# Patient Record
Sex: FEMALE | Race: BLACK OR AFRICAN AMERICAN | Employment: UNEMPLOYED | URBAN - METROPOLITAN AREA
[De-identification: names, ages, dates, MRNs, and addresses within clinical notes are randomized per-mention and may not be internally consistent; named-entity substitution may affect disease eponyms.]

---

## 2017-11-28 ENCOUNTER — OFFICE VISIT (OUTPATIENT)
Dept: SURGERY | Age: 32
End: 2017-11-28

## 2017-11-28 VITALS
RESPIRATION RATE: 16 BRPM | DIASTOLIC BLOOD PRESSURE: 73 MMHG | SYSTOLIC BLOOD PRESSURE: 111 MMHG | BODY MASS INDEX: 38.74 KG/M2 | WEIGHT: 205.2 LBS | OXYGEN SATURATION: 99 % | HEIGHT: 61 IN | HEART RATE: 71 BPM

## 2017-11-28 DIAGNOSIS — K42.9 UMBILICAL HERNIA WITHOUT OBSTRUCTION AND WITHOUT GANGRENE: Primary | ICD-10-CM

## 2017-11-28 RX ORDER — TOPIRAMATE 25 MG/1
TABLET ORAL
Status: ON HOLD | COMMUNITY
End: 2017-12-15

## 2017-11-28 NOTE — MR AVS SNAPSHOT
Visit Information Date & Time Provider Department Dept. Phone Encounter #  
 11/28/2017 11:20 AM Lester Martin MD [facility] [address] [id number] Your Appointments 12/29/2017  3:40 PM  
POST OP with Lester Martin MD  
[facility] ([address]) Appt Note: po- UHR 12/15/17  
 [address], [address] [facility], [address] [phone]  
[address] [facility], [address] [facility] [address] [phone] Upcoming Health Maintenance Date Due Pneumococcal 19-64 Medium Risk (1 of 1 - PPSV23) 11/5/2004 DTaP/Tdap/Td series (1 - Tdap) 11/5/2006 PAP AKA CERVICAL CYTOLOGY 11/5/2006 Influenza Age 5 to Adult 8/1/2017 Allergies as of 11/28/2017  Review Complete On: 11/28/2017 By: Lester Martin MD  
  
 Severity Noted Reaction Type Reactions Iodine  11/28/2017    Unknown (comments) Topical only Pcn [Penicillins]  08/21/2012    Other (comments) Unknown her mother told her she was Current Immunizations  Never Reviewed Name Date Influenza Vaccine PF 9/23/2015 Not reviewed this visit You Were Diagnosed With   
  
 Codes Comments Umbilical hernia without obstruction and without gangrene    -  Primary ICD-10-CM: K42.9 ICD-9-CM: 553.1 Vitals BP Pulse Resp Height(growth percentile) Weight(growth percentile) SpO2  
 111/73 (BP 1 Location: Left arm, BP Patient Position: Sitting) 71 16 5' 0.5\" (1.537 m) 205 lb 3.2 oz (93.1 kg) 99% BMI OB Status Smoking Status 39.42 kg/m2 Having regular periods Former Smoker BMI and BSA Data Body Mass Index Body Surface Area  
 39.42 kg/m 2 1.99 m 2 Preferred Pharmacy Pharmacy Name Phone [id number] Joeljoey Fisher, [address] [place] [address] [phone] Your Updated Medication List  
  
   
 This list is accurate as of: 11/28/17  1:12 PM.  Always use your most recent med list.  
  
  
  
  
 ergocalciferol 50,000 unit capsule Commonly known as:  ERGOCALCIFEROL Take 1 Cap by mouth every seven (7) days. escitalopram oxalate 10 mg tablet Commonly known as:  Darcus Skillern Take 1 Tab by mouth daily. ibuprofen 600 mg tablet Commonly known as:  MOTRIN Take 1 Tab by mouth every six (6) hours as needed for Pain. Omeprazole delayed release 20 mg tablet Commonly known as:  PRILOSEC D/R Take 1 Tab by mouth daily. OTHER  
methylprednisone 4 mg - migraines   Indications: Take daily  
  
 topiramate 25 mg tablet Commonly known as:  TOPAMAX Take  by mouth daily (with breakfast). Take only as needed after methylprednisone is finished. traZODone 50 mg tablet Commonly known as:  Ravin Oh Take 1 Tab by mouth nightly. Introducing [facility]! [facility] introduces [employer] patient portal. Now you can access parts of your medical record, email your doctor's office, and request medication refills online. 1. In your internet browser, go to [email or web address] 2. Click on the First Time User? Click Here link in the Sign In box. You will see the New Member Sign Up page. 3. Enter your [employer] Access Code exactly as it appears below. You will not need to use this code after youve completed the sign-up process. If you do not sign up before the expiration date, you must request a new code. · [employer] Access Code: [email or web address] Expires: 2/26/2018 11:00 AM 
 
4. Enter the last four digits of your Social Security Number (xxxx) and Date of Birth (mm/dd/yyyy) as indicated and click Submit. You will be taken to the next sign-up page. 5. Create a NSCt ID. This will be your [employer] login ID and cannot be changed, so think of one that is secure and easy to remember. 6. Create a NSCt password. You can change your password at any time. 7. Enter your Password Reset Question and Answer. This can be used at a later time if you forget your password. 8. Enter your e-mail address. You will receive e-mail notification when new information is available in [address] 9. Click Sign Up. You can now view and download portions of your medical record. 10. Click the Download Summary menu link to download a portable copy of your medical information. If you have questions, please visit the Frequently Asked Questions section of the [employer] website. Remember, [employer] is NOT to be used for urgent needs. For medical emergencies, dial 911. Now available from your iPhone and Android! Please provide this summary of care documentation to your next provider. Your primary care clinician is listed as Tio Porras. If you have any questions after today's visit, please call [phone].

## 2017-11-28 NOTE — PROGRESS NOTES
Surgery Consult:  Umbilical hernia  Requesting physician:  Self referred    Subjective:   Patient 28 y.o.  female presents with umbilical bulge for 2 years. It's increasing in size. It hasn't been bothering until more recently. For the past 6 months, patient has been having intermittent sharp periumbilical pain without radiation especially with lifting. Patient also complains of intermittent nausea but no emesis. No recent trauma, but patient does lot of heavy lifting at work > 50 lbs. Patient also with mild constipation. Prior abdominal surgeries include  x 3. No F/C/S. No dysuria. Past Medical & Surgical History:  Past Medical History:   Diagnosis Date    Asthma      delivery     Headache     migraine    Hernia, umbilical 9401    Psychiatric disorder     depression and anxiety      Past Surgical History:   Procedure Laterality Date    HX  SECTION  , ,     [facility] (Dr Jackelin Wells), [facility] (Dr Jackelin Wells)       Social History:  Social History     Social History    Marital status:      Spouse name: N/A    Number of children: N/A    Years of education: N/A     Occupational History    Not on file.      Social History Main Topics    Smoking status: Former Smoker     Packs/day: 0.25     Years: 5.00    Smokeless tobacco: Never Used    Alcohol use Yes      Comment: rarely    Drug use: Not on file    Sexual activity: Yes     Partners: Male     Other Topics Concern    Not on file     Social History Narrative        Family History:  Family History   Problem Relation Age of Onset    Diabetes Father     Hypertension Father     Diabetes Maternal Grandmother     Cancer Paternal Aunt      breast    Hypertension Paternal Grandmother     Diabetes Paternal Grandmother     Cancer Paternal Grandmother      lung    Cancer Paternal Grandfather      pancreas        Medications:  Current Outpatient Prescriptions   Medication Sig    OTHER methylprednisone 4 mg - migraines   Indications: Take daily    topiramate (TOPAMAX) 25 mg tablet Take  by mouth daily (with breakfast). Take only as needed after methylprednisone is finished.  Omeprazole delayed release (PRILOSEC D/R) 20 mg tablet Take 1 Tab by mouth daily.  ergocalciferol (ERGOCALCIFEROL) 50,000 unit capsule Take 1 Cap by mouth every seven (7) days.  escitalopram oxalate (LEXAPRO) 10 mg tablet Take 1 Tab by mouth daily.  traZODone (DESYREL) 50 mg tablet Take 1 Tab by mouth nightly.  ibuprofen (MOTRIN) 600 mg tablet Take 1 Tab by mouth every six (6) hours as needed for Pain. No current facility-administered medications for this visit. Allergies: Allergies   Allergen Reactions    Iodine Unknown (comments)     Topical only    Pcn [Penicillins] Other (comments)     Unknown her mother told her she was       Review of Systems  A comprehensive review of systems was negative except for that written in the HPI. Objective:     Exam:    Visit Vitals    /73 (BP 1 Location: Left arm, BP Patient Position: Sitting)    Pulse 71    Resp 16    Ht 5' 0.5\" (1.537 m)    Wt 93.1 kg (205 lb 3.2 oz)    SpO2 99%    BMI 39.42 kg/m2     General appearance: alert, cooperative, no distress, appears stated age  Eyes: no sclera icterus  Lungs: clear to auscultation bilaterally  Heart: regular rate and rhythm  Abdomen: soft, non-tender. Non-distended. Umbilical bulge, non-reducible, measuring 1 x 2 cm. Unable to appreciate the size of the defect. Extremities: extremities normal, atraumatic, no cyanosis or edema  Skin: Skin color, texture, turgor normal. No rashes or lesions. No jaundice. Neurologic: Grossly normal      Assessment:     Umbilical hernia, non-reducible  Morbid obesity (BMI 39)    Plan:     Incarcerated UHR  Risks, benefit, and alternative to surgery was discussed with the patient.   The risks of surgery include but not limited to infection, bleeding, intraabdominal organ injury, recurrence, and the risks of general anesthetic. Patient is agreeable to surgery. All questions answered.

## 2017-12-05 ENCOUNTER — TELEPHONE (OUTPATIENT)
Dept: SURGERY | Age: 32
End: 2017-12-05

## 2017-12-11 NOTE — PERIOP NOTES
[facility]  Preoperative Instructions        Surgery Date 12/15/17          Time of Arrival 8:30am    1. On the day of your surgery, please report to the Surgical Services Registration Desk and sign in at your designated time. The Surgery Center is located to the right of the Emergency Room. 2. You must have someone with you to drive you home. You should not drive a car for 24 hours following surgery. Please make arrangements for a friend or family member to stay with you for the first 24 hours after your surgery. 3. Do not have anything to eat or drink (including water, gum, mints, coffee, juice) after midnight 12/14/17      . ? This may not apply to medications prescribed by your physician. ?(Please note below the special instructions with medications to take the morning of your procedure.)    4. We recommend you do not drink any alcoholic beverages for 24 hours before and after your surgery. 5. Contact your surgeons office for instructions on the following medications: non-steroidal anti-inflammatory drugs (i.e. Advil, Aleve), vitamins, and supplements. (Some surgeons will want you to stop these medications prior to surgery and others may allow you to take them)  **If you are currently taking Plavix, Coumadin, Aspirin and/or other blood-thinning agents, contact your surgeon for instructions. ** Your surgeon will partner with the physician prescribing these medications to determine if it is safe to stop or if you need to continue taking. Please do not stop taking these medications without instructions from your surgeon    6. Wear comfortable clothes. Wear glasses instead of contacts. Do not bring any money or jewelry. Please bring picture ID, insurance card, and any prearranged co-payment or hospital payment. Do not wear make-up, particularly mascara the morning of your surgery. Do not wear nail polish, particularly if you are having foot /hand surgery.   Wear your hair loose or down, no ponytails, buns, brie pins or clips. All body piercings must be removed. Please shower with antibacterial soap for three consecutive days before and on the morning of surgery, but do not apply any lotions, powders or deodorants after the shower on the day of surgery. Please use a fresh towels after each shower. Please sleep in clean clothes and change bed linens the night before surgery. Please do not shave for 48 hours prior to surgery. Shaving of the face is acceptable. 7. You should understand that if you do not follow these instructions your surgery may be cancelled. If your physical condition changes (I.e. fever, cold or flu) please contact your surgeon as soon as possible. 8. It is important that you be on time. If a situation occurs where you may be late, please call [phone] (OR Holding Area). 9. If you have any questions and or problems, please call [phone] (Pre-admission Testing). 10. Your surgery time may be subject to change. You will receive a phone call the evening prior if your time changes. 11.  If having outpatient surgery, you must have someone to drive you here, stay with you during the duration of your stay, and to drive you home at time of discharge. 12.   In an effort to improve the efficiency, privacy, and safety for all of our Pre-op patients visitors are not allowed in the Holding area. Once you arrive and are registered your family/visitors will be asked to remain in the waiting room. The Pre-op staff will get you from the Surgical Waiting Area and will explain to you and your family/visitors that the Pre-op phase is beginning. The staff will answer any questions and provide instructions for tracking of the patient, by use of the existing tracking number and color-coded status board in the waiting room.   At this time the staff will also ask for your designated spokesperson information in the event that the physician or staff need to provide an update or obtain any pertinent information. The designated spokesperson will be notified if the physician needs to speak to family during the pre-operative phase. If at any time your family/visitors has questions or concerns they may approach the volunteer desk in the waiting area for assistance. Special Instructions:    MEDICATIONS TO TAKE THE MORNING OF SURGERY WITH A SIP OF WATER:None      I understand a pre-operative phone call will be made to verify my surgery time. In the event that I am not available, I give permission for a message to be left on my answering service and/or with another person?  Yes          ___________________      __________   _________    (Signature of Patient)             (Witness)                (Date and Time)

## 2017-12-15 ENCOUNTER — HOSPITAL ENCOUNTER (OUTPATIENT)
Age: 32
Setting detail: OUTPATIENT SURGERY
Discharge: HOME | End: 2017-12-15
Attending: SURGERY | Admitting: SURGERY
Payer: MEDICAID

## 2017-12-15 ENCOUNTER — ANESTHESIA EVENT (OUTPATIENT)
Dept: SURGERY | Age: 32
End: 2017-12-15
Payer: MEDICAID

## 2017-12-15 ENCOUNTER — ANESTHESIA (OUTPATIENT)
Dept: SURGERY | Age: 32
End: 2017-12-15
Payer: MEDICAID

## 2017-12-15 VITALS
HEART RATE: 65 BPM | RESPIRATION RATE: 16 BRPM | DIASTOLIC BLOOD PRESSURE: 67 MMHG | TEMPERATURE: 98.2 F | BODY MASS INDEX: 40.47 KG/M2 | WEIGHT: 206.13 LBS | SYSTOLIC BLOOD PRESSURE: 114 MMHG | HEIGHT: 60 IN | OXYGEN SATURATION: 94 %

## 2017-12-15 LAB — HCG UR QL: NEGATIVE

## 2017-12-15 PROCEDURE — 74011250636 HC RX REV CODE- 250/636

## 2017-12-15 PROCEDURE — 77030002974 HC SUT PLN J&J -A: Performed by: SURGERY

## 2017-12-15 PROCEDURE — 77030026438 HC STYL ET INTUB CARD -A: Performed by: ANESTHESIOLOGY

## 2017-12-15 PROCEDURE — 74011250636 HC RX REV CODE- 250/636: Performed by: ANESTHESIOLOGY

## 2017-12-15 PROCEDURE — 81025 URINE PREGNANCY TEST: CPT

## 2017-12-15 PROCEDURE — 76210000016 HC OR PH I REC 1 TO 1.5 HR: Performed by: SURGERY

## 2017-12-15 PROCEDURE — 77030018547 HC SUT ETHBND1 J&J -B: Performed by: SURGERY

## 2017-12-15 PROCEDURE — 74011000250 HC RX REV CODE- 250: Performed by: SURGERY

## 2017-12-15 PROCEDURE — 76210000021 HC REC RM PH II 0.5 TO 1 HR: Performed by: SURGERY

## 2017-12-15 PROCEDURE — 76010000138 HC OR TIME 0.5 TO 1 HR: Performed by: SURGERY

## 2017-12-15 PROCEDURE — 77030008684 HC TU ET CUF COVD -B: Performed by: ANESTHESIOLOGY

## 2017-12-15 PROCEDURE — 76060000032 HC ANESTHESIA 0.5 TO 1 HR: Performed by: SURGERY

## 2017-12-15 PROCEDURE — 77030018836 HC SOL IRR NACL ICUM -A: Performed by: SURGERY

## 2017-12-15 PROCEDURE — 77030002986 HC SUT PROL J&J -A: Performed by: SURGERY

## 2017-12-15 PROCEDURE — 74011000250 HC RX REV CODE- 250

## 2017-12-15 PROCEDURE — 77030011640 HC PAD GRND REM COVD -A: Performed by: SURGERY

## 2017-12-15 PROCEDURE — 77030020782 HC GWN BAIR PAWS FLX 3M -B

## 2017-12-15 PROCEDURE — 77030010507 HC ADH SKN DERMBND J&J -B: Performed by: SURGERY

## 2017-12-15 PROCEDURE — 74011250636 HC RX REV CODE- 250/636: Performed by: SURGERY

## 2017-12-15 PROCEDURE — 77030036554: Performed by: SURGERY

## 2017-12-15 PROCEDURE — 77030032490 HC SLV COMPR SCD KNE COVD -B: Performed by: SURGERY

## 2017-12-15 PROCEDURE — 77030031139 HC SUT VCRL2 J&J -A: Performed by: SURGERY

## 2017-12-15 RX ORDER — LIDOCAINE HYDROCHLORIDE 10 MG/ML
0.1 INJECTION, SOLUTION EPIDURAL; INFILTRATION; INTRACAUDAL; PERINEURAL AS NEEDED
Status: DISCONTINUED | OUTPATIENT
Start: 2017-12-15 | End: 2017-12-15 | Stop reason: HOSPADM

## 2017-12-15 RX ORDER — ONDANSETRON 2 MG/ML
INJECTION INTRAMUSCULAR; INTRAVENOUS AS NEEDED
Status: DISCONTINUED | OUTPATIENT
Start: 2017-12-15 | End: 2017-12-15 | Stop reason: HOSPADM

## 2017-12-15 RX ORDER — FENTANYL CITRATE 50 UG/ML
25 INJECTION, SOLUTION INTRAMUSCULAR; INTRAVENOUS
Status: DISCONTINUED | OUTPATIENT
Start: 2017-12-15 | End: 2017-12-15 | Stop reason: HOSPADM

## 2017-12-15 RX ORDER — GLYCOPYRROLATE 0.2 MG/ML
INJECTION INTRAMUSCULAR; INTRAVENOUS AS NEEDED
Status: DISCONTINUED | OUTPATIENT
Start: 2017-12-15 | End: 2017-12-15 | Stop reason: HOSPADM

## 2017-12-15 RX ORDER — DIPHENHYDRAMINE HYDROCHLORIDE 50 MG/ML
12.5 INJECTION, SOLUTION INTRAMUSCULAR; INTRAVENOUS AS NEEDED
Status: DISCONTINUED | OUTPATIENT
Start: 2017-12-15 | End: 2017-12-15 | Stop reason: HOSPADM

## 2017-12-15 RX ORDER — MIDAZOLAM HYDROCHLORIDE 1 MG/ML
INJECTION, SOLUTION INTRAMUSCULAR; INTRAVENOUS AS NEEDED
Status: DISCONTINUED | OUTPATIENT
Start: 2017-12-15 | End: 2017-12-15 | Stop reason: HOSPADM

## 2017-12-15 RX ORDER — DOCUSATE SODIUM 100 MG/1
100 CAPSULE, LIQUID FILLED ORAL 2 TIMES DAILY
Qty: 30 CAP | Refills: 0 | Status: SHIPPED | OUTPATIENT
Start: 2017-12-15 | End: 2019-09-14

## 2017-12-15 RX ORDER — SODIUM CHLORIDE, SODIUM LACTATE, POTASSIUM CHLORIDE, CALCIUM CHLORIDE 600; 310; 30; 20 MG/100ML; MG/100ML; MG/100ML; MG/100ML
25 INJECTION, SOLUTION INTRAVENOUS CONTINUOUS
Status: DISCONTINUED | OUTPATIENT
Start: 2017-12-15 | End: 2017-12-15 | Stop reason: HOSPADM

## 2017-12-15 RX ORDER — PROPOFOL 10 MG/ML
INJECTION, EMULSION INTRAVENOUS AS NEEDED
Status: DISCONTINUED | OUTPATIENT
Start: 2017-12-15 | End: 2017-12-15 | Stop reason: HOSPADM

## 2017-12-15 RX ORDER — NEOSTIGMINE METHYLSULFATE 1 MG/ML
INJECTION INTRAVENOUS AS NEEDED
Status: DISCONTINUED | OUTPATIENT
Start: 2017-12-15 | End: 2017-12-15 | Stop reason: HOSPADM

## 2017-12-15 RX ORDER — CLINDAMYCIN PHOSPHATE 900 MG/50ML
900 INJECTION INTRAVENOUS
Status: COMPLETED | OUTPATIENT
Start: 2017-12-15 | End: 2017-12-15

## 2017-12-15 RX ORDER — FENTANYL CITRATE 50 UG/ML
INJECTION, SOLUTION INTRAMUSCULAR; INTRAVENOUS AS NEEDED
Status: DISCONTINUED | OUTPATIENT
Start: 2017-12-15 | End: 2017-12-15 | Stop reason: HOSPADM

## 2017-12-15 RX ORDER — MIDAZOLAM HYDROCHLORIDE 1 MG/ML
1 INJECTION, SOLUTION INTRAMUSCULAR; INTRAVENOUS AS NEEDED
Status: DISCONTINUED | OUTPATIENT
Start: 2017-12-15 | End: 2017-12-15 | Stop reason: HOSPADM

## 2017-12-15 RX ORDER — HYDROCODONE BITARTRATE AND ACETAMINOPHEN 5; 325 MG/1; MG/1
1-2 TABLET ORAL
Qty: 50 TAB | Refills: 0 | Status: SHIPPED | OUTPATIENT
Start: 2017-12-15 | End: 2019-09-14

## 2017-12-15 RX ORDER — ONDANSETRON 2 MG/ML
4 INJECTION INTRAMUSCULAR; INTRAVENOUS AS NEEDED
Status: DISCONTINUED | OUTPATIENT
Start: 2017-12-15 | End: 2017-12-15 | Stop reason: HOSPADM

## 2017-12-15 RX ORDER — LIDOCAINE HYDROCHLORIDE 20 MG/ML
INJECTION, SOLUTION EPIDURAL; INFILTRATION; INTRACAUDAL; PERINEURAL AS NEEDED
Status: DISCONTINUED | OUTPATIENT
Start: 2017-12-15 | End: 2017-12-15 | Stop reason: HOSPADM

## 2017-12-15 RX ORDER — DEXAMETHASONE SODIUM PHOSPHATE 4 MG/ML
INJECTION, SOLUTION INTRA-ARTICULAR; INTRALESIONAL; INTRAMUSCULAR; INTRAVENOUS; SOFT TISSUE AS NEEDED
Status: DISCONTINUED | OUTPATIENT
Start: 2017-12-15 | End: 2017-12-15 | Stop reason: HOSPADM

## 2017-12-15 RX ORDER — ACETAMINOPHEN 10 MG/ML
INJECTION, SOLUTION INTRAVENOUS AS NEEDED
Status: DISCONTINUED | OUTPATIENT
Start: 2017-12-15 | End: 2017-12-15 | Stop reason: HOSPADM

## 2017-12-15 RX ORDER — METHYLPREDNISOLONE 4 MG/1
TABLET ORAL
COMMUNITY
End: 2019-09-14

## 2017-12-15 RX ORDER — KETOROLAC TROMETHAMINE 30 MG/ML
INJECTION, SOLUTION INTRAMUSCULAR; INTRAVENOUS AS NEEDED
Status: DISCONTINUED | OUTPATIENT
Start: 2017-12-15 | End: 2017-12-15 | Stop reason: HOSPADM

## 2017-12-15 RX ORDER — BUPIVACAINE HYDROCHLORIDE AND EPINEPHRINE 2.5; 5 MG/ML; UG/ML
INJECTION, SOLUTION EPIDURAL; INFILTRATION; INTRACAUDAL; PERINEURAL AS NEEDED
Status: DISCONTINUED | OUTPATIENT
Start: 2017-12-15 | End: 2017-12-15 | Stop reason: HOSPADM

## 2017-12-15 RX ORDER — ROCURONIUM BROMIDE 10 MG/ML
INJECTION, SOLUTION INTRAVENOUS AS NEEDED
Status: DISCONTINUED | OUTPATIENT
Start: 2017-12-15 | End: 2017-12-15 | Stop reason: HOSPADM

## 2017-12-15 RX ORDER — FENTANYL CITRATE 50 UG/ML
50 INJECTION, SOLUTION INTRAMUSCULAR; INTRAVENOUS AS NEEDED
Status: DISCONTINUED | OUTPATIENT
Start: 2017-12-15 | End: 2017-12-15 | Stop reason: HOSPADM

## 2017-12-15 RX ORDER — HYDROMORPHONE HYDROCHLORIDE 1 MG/ML
.2-.5 INJECTION, SOLUTION INTRAMUSCULAR; INTRAVENOUS; SUBCUTANEOUS
Status: DISCONTINUED | OUTPATIENT
Start: 2017-12-15 | End: 2017-12-15 | Stop reason: HOSPADM

## 2017-12-15 RX ADMIN — LIDOCAINE HYDROCHLORIDE 100 MG: 20 INJECTION, SOLUTION EPIDURAL; INFILTRATION; INTRACAUDAL; PERINEURAL at 11:09

## 2017-12-15 RX ADMIN — GLYCOPYRROLATE 0.2 MG: 0.2 INJECTION INTRAMUSCULAR; INTRAVENOUS at 11:05

## 2017-12-15 RX ADMIN — ONDANSETRON 4 MG: 2 INJECTION INTRAMUSCULAR; INTRAVENOUS at 10:36

## 2017-12-15 RX ADMIN — FENTANYL CITRATE 25 MCG: 50 INJECTION, SOLUTION INTRAMUSCULAR; INTRAVENOUS at 12:15

## 2017-12-15 RX ADMIN — KETOROLAC TROMETHAMINE 30 MG: 30 INJECTION, SOLUTION INTRAMUSCULAR; INTRAVENOUS at 11:06

## 2017-12-15 RX ADMIN — ONDANSETRON 4 MG: 2 INJECTION INTRAMUSCULAR; INTRAVENOUS at 11:39

## 2017-12-15 RX ADMIN — FENTANYL CITRATE 25 MCG: 50 INJECTION, SOLUTION INTRAMUSCULAR; INTRAVENOUS at 11:32

## 2017-12-15 RX ADMIN — MIDAZOLAM HYDROCHLORIDE 2 MG: 1 INJECTION, SOLUTION INTRAMUSCULAR; INTRAVENOUS at 10:24

## 2017-12-15 RX ADMIN — FENTANYL CITRATE 100 MCG: 50 INJECTION, SOLUTION INTRAMUSCULAR; INTRAVENOUS at 10:30

## 2017-12-15 RX ADMIN — SODIUM CHLORIDE, SODIUM LACTATE, POTASSIUM CHLORIDE, AND CALCIUM CHLORIDE 25 ML/HR: 600; 310; 30; 20 INJECTION, SOLUTION INTRAVENOUS at 09:35

## 2017-12-15 RX ADMIN — FENTANYL CITRATE 25 MCG: 50 INJECTION, SOLUTION INTRAMUSCULAR; INTRAVENOUS at 11:42

## 2017-12-15 RX ADMIN — LIDOCAINE HYDROCHLORIDE 100 MG: 20 INJECTION, SOLUTION EPIDURAL; INFILTRATION; INTRACAUDAL; PERINEURAL at 10:30

## 2017-12-15 RX ADMIN — NEOSTIGMINE METHYLSULFATE 3 MG: 1 INJECTION INTRAVENOUS at 11:05

## 2017-12-15 RX ADMIN — CLINDAMYCIN PHOSPHATE 900 MG: 18 INJECTION, SOLUTION INTRAVENOUS at 10:33

## 2017-12-15 RX ADMIN — PROPOFOL 200 MG: 10 INJECTION, EMULSION INTRAVENOUS at 10:30

## 2017-12-15 RX ADMIN — ACETAMINOPHEN 1000 MG: 10 INJECTION, SOLUTION INTRAVENOUS at 11:06

## 2017-12-15 RX ADMIN — DEXAMETHASONE SODIUM PHOSPHATE 8 MG: 4 INJECTION, SOLUTION INTRA-ARTICULAR; INTRALESIONAL; INTRAMUSCULAR; INTRAVENOUS; SOFT TISSUE at 10:36

## 2017-12-15 RX ADMIN — ROCURONIUM BROMIDE 30 MG: 10 INJECTION, SOLUTION INTRAVENOUS at 10:30

## 2017-12-15 RX ADMIN — FENTANYL CITRATE 25 MCG: 50 INJECTION, SOLUTION INTRAMUSCULAR; INTRAVENOUS at 12:03

## 2017-12-15 NOTE — INTERVAL H&P NOTE
H&P Update:  Betty Montano was seen and examined. History and physical has been reviewed. The patient has been examined.  There have been no significant clinical changes since the completion of the originally dated History and Physical.    Signed By: Meg Moran MD     December 15, 2017 8:46 AM

## 2017-12-15 NOTE — PERIOP NOTES
[address] and updated family. Allowed time for questions and answers. [id number] TRANSFER - OUT REPORT:    Verbal report given to [facility] RN(name) on Meseret Lloyd  being transferred to Phase II(unit) for routine post - op       Report consisted of patients Situation, Background, Assessment and   Recommendations(SBAR). Information from the following report(s) SBAR, Kardex, OR Summary, Procedure Summary, Intake/Output, MAR, Accordion, Recent Results, Med Rec Status, Cardiac Rhythm NSR and Alarm Parameters  was reviewed with the receiving nurse. Opportunity for questions and clarification was provided.       Patient transported with:   Registered Nurse

## 2017-12-15 NOTE — IP AVS SNAPSHOT
[address] [facility] 
[phone] Patient: Sandra Arteaga MRN: [id number] OUF: About your hospitalization You were admitted on:  December 15, 2017 You last received care in the:  [facility] SURGERY You were discharged on:  December 15, 2017 Why you were hospitalized Your primary diagnosis was:  Not on File Things You Need To Do (next 8 weeks) Follow up with Sean Ovalles MD  
  
Phone:  [phone] Where:  [address] [address] Discharge Orders None A check kimberly indicates which time of day the medication should be taken. My Medications TAKE these medications as instructed Instructions Each Dose to Equal  
 Morning Noon Evening Bedtime  
 docusate sodium 100 mg capsule Commonly known as:  Davy El Your last dose was: Your next dose is: Take 1 Cap by mouth two (2) times a day. 100 mg HYDROcodone-acetaminophen 5-325 mg per tablet Commonly known as:  Carin Arts Your last dose was: Your next dose is: Take 1-2 Tabs by mouth every four (4) hours as needed for Pain. Max Daily Amount: 12 Tabs. 1-2 Tab  
    
   
   
   
  
 methylPREDNISolone 4 mg Tab Commonly known as:  MEDROL Your last dose was: Your next dose is: Take  by mouth daily (with breakfast). Where to Get Your Medications Information on where to get these meds will be given to you by the nurse or doctor. ! Ask your nurse or doctor about these medications  
  docusate sodium 100 mg capsule HYDROcodone-acetaminophen 5-325 mg per tablet Discharge Instructions Patient Discharge Instructions Sandra Arteaga / [id number] : 1985 Admitted 12/15/2017 Discharged: 12/15/2017 What to do at [facility] Recommended diet: Regular Diet Recommended activity: no heavy lifting > 20 lbs x 6 weeks; no driving while taking narcotics for pain. May take shower, but no bath x 2 weeks. Keep ice over the incision to minimize swelling. Please wear abdominal binder when ambulating. If you experience a lot of drainage, develop redness around the wound, or a fever over 101 F occurs please call the office. Narcotics and anesthesia sometimes cause nausea and vomiting. If persistent please call the office. Do not hesitate to call with questions or concerns. Follow-up with Dr. Bin Hi in 2 weeks. Please call [phone] for an appointment. Information obtained by : 
I understand that if any problems occur once I am at home I am to contact my physician. I understand and acknowledge receipt of the instructions indicated above. Physician's or R.N.'s Signature                                                                  Date/Time DISCHARGE SUMMARY from Nurse PATIENT INSTRUCTIONS: 
 
After general anesthesia or intravenous sedation, for 24 hours or while taking prescription Narcotics: · Limit your activities · Do not drive and operate hazardous machinery · Do not make important personal or business decisions · Do  not drink alcoholic beverages · If you have not urinated within 8 hours after discharge, please contact your surgeon on call. Report the following to your surgeon: 
· Excessive pain, swelling, redness or odor of or around the surgical area · Temperature over 100.5 · Nausea and vomiting lasting longer than 4 hours or if unable to take medications · Any signs of decreased circulation or nerve impairment to extremity: change in color, persistent  numbness, tingling, coldness or increase pain · Any questions What to do at Home: A common side effect of anesthesia following surgery is nausea and/or vomiting. In order to decrease symptoms, it is wise to avoid foods that are high in fat, greasy foods, milk products, and spicy foods for the first 24 hours. Acceptable foods for the first 24 hours following surgery include but are not limited to: 
 
? soup 
? broth 
?  toast  
? crackers ? applesauce 
? bananas  
? mashed potatoes, 
? soft or scrambled eggs 
? oatmeal 
?  jello It is important to eat when taking your pain medication. This will help to prevent nausea. If possible, please try to time your meals with your medications. It is very important to stay hydrated following surgery. Sip fluids frequently while awake. Avoid acidic drinks such as citrus juices and soda for 24 hours. Carbonated beverages may cause bloating and gas. Acceptable fluids include: 
 
? water (flavor packets may add variety) ? coffee or tea (in moderation) ? Gatorade ? Donna Tian ? apple juice 
? cranberry juice You are encouraged to cough and deep breathe every hour when awake. This will help to prevent respiratory complications following anesthesia. You may want to hug a pillow when coughing and sneezing to add additional support to the surgical area and to decrease discomfort if you had abdominal or chest surgery. If you are discharged home with support stockings, you may remove them after 24 hours. Support stockings are used to help prevent blood clots in the legs following surgery. Please take time to review all of your Home Care Instructions and Medication Information sheets provided in your discharge packet. If you have any questions, please contact your surgeons office. Thank you.  
 
 
 
 
 
How to Care for Your Wound After Its Treated With 
 DERMABOND* Topical Skin Adhesive DERMABOND* Topical Skin Adhesive (2-octyl cyanoacrylate) is a sterile, liquid skin adhesive 
that holds wound edges together. The film will usually remain in place for 5 to 10 days, then 
naturally fall off your skin. The following will answer some of your questions and provide instructions for proper care for your 
wound while it is healing: CHECK WOUND APPEARANCE 
 Some swelling, redness, and pain are common with all wounds and normally will go away as the 
wound heals. If swelling, redness, or pain increases or if the wound feels warm to the touch, 
contact a doctor. Also contact a doctor if the wound edges reopen or separate. REPLACE BANDAGES 
 If your wound is bandaged, keep the bandage dry.  Replace the dressing daily until the adhesive film has fallen off or if the 
bandage should become wet, unless otherwise instructed by your 
physician.  When changing the dressing, do not place tape directly over the DERMABOND adhesive film, because removing the tape later may also 
remove the film. AVOID TOPICAL MEDICATIONS  Do not apply liquid or ointment medications or any other product to your wound while the DERMABOND adhesive film is in place. These may loosen the film before your wound is healed. KEEP WOUND DRY AND PROTECTED  You may occasionally and briefly wet your wound in the shower or bath. Do not soak or scrub 
your wound, do not swim, and avoid periods of heavy perspiration until the DERMABOND 
adhesive has naturally fallen off. After showering or bathing, gently blot your wound dry with a 
soft towel. If a protective dressing is being used, apply a fresh, dry bandage, being sure to keep 
the tape off the DERMABOND adhesive film.  Apply a clean, dry bandage over the wound if necessary to protect it.  Protect your wound from injury until the skin has had sufficient time to heal. 
 Do not scratch, rub, or pick at the DERMABOND adhesive film.  This may loosen the film before 
your wound is healed.  Protect the wound from prolonged exposure to sunlight or tanning lamps while the film is in 
place. If you have any questions or concerns about this product, please consult your doctor. *Trademark ©[employer] 2002 Narcotic-Analgesic/Acetaminophen (Percocet, Norco, Lorcet HD, Lortab 10/325) - (By mouth) Why this medicine is used:  
Relieves pain. Contact a nurse or doctor right away if you have: 
· Extreme weakness, shallow breathing, slow heartbeat · Severe confusion, lightheadedness, dizziness, fainting · Yellow skin or eyes, dark urine or pale stools · Severe constipation, severe stomach pain, nausea, vomiting, loss of appetite · Sweating or cold, clammy skin Common side effects: · Mild constipation, nausea, vomiting · Sleepiness, tiredness · Itching, rash © 2017 [address]  Information is for End User's use only and may not be sold, redistributed or otherwise used for commercial purposes. Please carry medication information at all times in case of emergency situations. These are general instructions for a healthy lifestyle: No smoking/ No tobacco products/ Avoid exposure to second hand smoke Surgeon General's Warning:  Quitting smoking now greatly reduces serious risk to your health. Obesity, smoking, and sedentary lifestyle greatly increases your risk for illness A healthy diet, regular physical exercise & weight monitoring are important for maintaining a healthy lifestyle You may be retaining fluid if you have a history of heart failure or if you experience any of the following symptoms:  Weight gain of 3 pounds or more overnight or 5 pounds in a week, increased swelling in our hands or feet or shortness of breath while lying flat in bed. Please call your doctor as soon as you notice any of these symptoms; do not wait until your next office visit.  
 
Recognize signs and symptoms of STROKE: 
 
 F-face looks uneven A-arms unable to move or move unevenly S-speech slurred or non-existent T-time-call 911 as soon as signs and symptoms begin-DO NOT go Back to bed or wait to see if you get better-TIME IS BRAIN. Warning Signs of HEART ATTACK Call 911 if you have these symptoms: 
? Chest discomfort. Most heart attacks involve discomfort in the center of the chest that lasts more than a few minutes, or that goes away and comes back. It can feel like uncomfortable pressure, squeezing, fullness, or pain. ? Discomfort in other areas of the upper body. Symptoms can include pain or discomfort in one or both arms, the back, neck, jaw, or stomach. ? Shortness of breath with or without chest discomfort. ? Other signs may include breaking out in a cold sweat, nausea, or lightheadedness. Don't wait more than five minutes to call [address]! Fast action can save your life. Calling 911 is almost always the fastest way to get lifesaving treatment. Emergency Medical Services staff can begin treatment when they arrive  up to an hour sooner than if someone gets to the hospital by car. The discharge information has been reviewed with the patient and caregiver. The patient and caregiver verbalized understanding. Discharge medications reviewed with the patient and caregiver and appropriate educational materials and side effects teaching were provided. ___________________________________________________________________________________________________________________________________ Patient or Representative Signature                                                          Date/Time Introducing [facility]! Josee Siegel introduces [employer] patient portal. Now you can access parts of your medical record, email your doctor's office, and request medication refills online. 1. In your internet browser, go to [email or web address] 2. Click on the First Time User? Click Here link in the Sign In box. You will see the New Member Sign Up page. 3. Enter your [employer] Access Code exactly as it appears below. You will not need to use this code after youve completed the sign-up process. If you do not sign up before the expiration date, you must request a new code. · [employer] Access Code: [email or web address] Expires: 2/26/2018 11:00 AM 
 
4. Enter the last four digits of your Social Security Number (xxxx) and Date of Birth (mm/dd/yyyy) as indicated and click Submit. You will be taken to the next sign-up page. 5. Create a [employer] ID. This will be your [employer] login ID and cannot be changed, so think of one that is secure and easy to remember. 6. Create a [employer] password. You can change your password at any time. 7. Enter your Password Reset Question and Answer. This can be used at a later time if you forget your password. 8. Enter your e-mail address. You will receive e-mail notification when new information is available in [address] 9. Click Sign Up. You can now view and download portions of your medical record. 10. Click the Download Summary menu link to download a portable copy of your medical information. If you have questions, please visit the Frequently Asked Questions section of the [employer] website. Remember, [employer] is NOT to be used for urgent needs. For medical emergencies, dial 911. Now available from your iPhone and Android! Providers Seen During Your Hospitalization Provider Specialty Primary office phone Vidya Nickerson MD General Surgery [phone] Your Primary Care Physician (PCP) Primary Care Physician Office Phone Office Fax Jesica Escamilla [phone] You are allergic to the following Allergen Reactions Iodine Unknown (comments) Topical only Pcn (Penicillins) Other (comments) Unknown her mother told her she was Recent Documentation Height Weight BMI OB Status Smoking Status 1.524 m 93.5 kg 40.26 kg/m2 Having regular periods Former Smoker Emergency Contacts Name Discharge Info Relation Home Work Mobile [id number] CAREGIVER [3] Spouse [3] [phone] Patient Belongings The following personal items are in your possession at time of discharge: 
  Dental Appliances: None  Visual Aid: None   Hearing Aids/Status: Does not own  Home Medications: None   Jewelry: None  Clothing: Socks, Footwear, Dress (TO PACU)    Other Valuables: None  Personal Items Sent to Safe: Declines Please provide this summary of care documentation to your next provider. Signatures-by signing, you are acknowledging that this After Visit Summary has been reviewed with you and you have received a copy. Patient Signature:  ____________________________________________________________ Date:  ____________________________________________________________  
  
[facility] Provider Signature:  ____________________________________________________________ Date:  ____________________________________________________________

## 2017-12-15 NOTE — DISCHARGE INSTRUCTIONS
Patient Discharge Instructions    [facility] / [id number] : 1985    Admitted 12/15/2017 Discharged: 12/15/2017         What to do at Home    Recommended diet: Regular Diet    Recommended activity: no heavy lifting > 20 lbs x 6 weeks; no driving while taking narcotics for pain. May take shower, but no bath x 2 weeks. Keep ice over the incision to minimize swelling. Please wear abdominal binder when ambulating. If you experience a lot of drainage, develop redness around the wound, or a fever over 101 F occurs please call the office. Narcotics and anesthesia sometimes cause nausea and vomiting. If persistent please call the office. Do not hesitate to call with questions or concerns. Follow-up with Dr. Isis Lundberg in 2 weeks. Please call [phone] for an appointment. Information obtained by :  I understand that if any problems occur once I am at home I am to contact my physician. I understand and acknowledge receipt of the instructions indicated above. Physician's or R.N.'s Signature                                                                  Date/Time                                                                                                                                              DISCHARGE SUMMARY from Nurse    PATIENT INSTRUCTIONS:    After general anesthesia or intravenous sedation, for 24 hours or while taking prescription Narcotics:  · Limit your activities  · Do not drive and operate hazardous machinery  · Do not make important personal or business decisions  · Do  not drink alcoholic beverages  · If you have not urinated within 8 hours after discharge, please contact your surgeon on call.     Report the following to your surgeon:  · Excessive pain, swelling, redness or odor of or around the surgical area  · Temperature over 100.5  · Nausea and vomiting lasting longer than 4 hours or if unable to take medications  · Any signs of decreased circulation or nerve impairment to extremity: change in color, persistent  numbness, tingling, coldness or increase pain  · Any questions    What to do at Home:  A common side effect of anesthesia following surgery is nausea and/or vomiting. In order to decrease symptoms, it is wise to avoid foods that are high in fat, greasy foods, milk products, and spicy foods for the first 24 hours. Acceptable foods for the first 24 hours following surgery include but are not limited to:     soup   broth    toast    crackers    applesauce    bananas    mashed potatoes,   soft or scrambled eggs   oatmeal    jello    It is important to eat when taking your pain medication. This will help to prevent nausea. If possible, please try to time your meals with your medications. It is very important to stay hydrated following surgery. Sip fluids frequently while awake. Avoid acidic drinks such as citrus juices and soda for 24 hours. Carbonated beverages may cause bloating and gas. Acceptable fluids include:    - water (flavor packets may add variety)  - coffee or tea (in moderation)  - Gatorade  - Calin-aid  - apple juice  - cranberry juice    You are encouraged to cough and deep breathe every hour when awake. This will help to prevent respiratory complications following anesthesia. You may want to hug a pillow when coughing and sneezing to add additional support to the surgical area and to decrease discomfort if you had abdominal or chest surgery. If you are discharged home with support stockings, you may remove them after 24 hours. Support stockings are used to help prevent blood clots in the legs following surgery. Please take time to review all of your Home Care Instructions and Medication Information sheets provided in your discharge packet.  If you have any questions, please contact your surgeons office. Thank you. How to Care for Your Wound After Its Treated With  DERMABOND* Topical Skin Adhesive  DERMABOND* Topical Skin Adhesive (2-octyl cyanoacrylate) is a sterile, liquid skin adhesive  that holds wound edges together. The film will usually remain in place for 5 to 10 days, then  naturally fall off your skin. The following will answer some of your questions and provide instructions for proper care for your  wound while it is healing:    CHECK WOUND APPEARANCE   Some swelling, redness, and pain are common with all wounds and normally will go away as the  wound heals. If swelling, redness, or pain increases or if the wound feels warm to the touch,  contact a doctor. Also contact a doctor if the wound edges reopen or separate. REPLACE BANDAGES   If your wound is bandaged, keep the bandage dry.  Replace the dressing daily until the adhesive film has fallen off or if the  bandage should become wet, unless otherwise instructed by your  physician.  When changing the dressing, do not place tape directly over the  DERMABOND adhesive film, because removing the tape later may also  remove the film. AVOID TOPICAL MEDICATIONS   Do not apply liquid or ointment medications or any other product to your wound while the  DERMABOND adhesive film is in place. These may loosen the film before your wound is healed. KEEP WOUND DRY AND PROTECTED   You may occasionally and briefly wet your wound in the shower or bath. Do not soak or scrub  your wound, do not swim, and avoid periods of heavy perspiration until the DERMABOND  adhesive has naturally fallen off. After showering or bathing, gently blot your wound dry with a  soft towel. If a protective dressing is being used, apply a fresh, dry bandage, being sure to keep  the tape off the DERMABOND adhesive film.  Apply a clean, dry bandage over the wound if necessary to protect it.    Protect your wound from injury until the skin has had sufficient time to heal.   Do not scratch, rub, or pick at the DERMABOND adhesive film. This may loosen the film before  your wound is healed.  Protect the wound from prolonged exposure to sunlight or tanning lamps while the film is in  place. If you have any questions or concerns about this product, please consult your doctor. *Trademark ©[employer] 2002   Narcotic-Analgesic/Acetaminophen (Percocet, Kristi [place], Lorcet HD, Lortab 10/325) - (By mouth)   Why this medicine is used:   Relieves pain. Contact a nurse or doctor right away if you have:  · Extreme weakness, shallow breathing, slow heartbeat  · Severe confusion, lightheadedness, dizziness, fainting  · Yellow skin or eyes, dark urine or pale stools  · Severe constipation, severe stomach pain, nausea, vomiting, loss of appetite  · Sweating or cold, clammy skin     Common side effects:  · Mild constipation, nausea, vomiting  · Sleepiness, tiredness  · Itching, rash  © 2017 [facility] Information is for End User's use only and may not be sold, redistributed or otherwise used for commercial purposes. Please carry medication information at all times in case of emergency situations. These are general instructions for a healthy lifestyle:    No smoking/ No tobacco products/ Avoid exposure to second hand smoke  Surgeon General's Warning:  Quitting smoking now greatly reduces serious risk to your health. Obesity, smoking, and sedentary lifestyle greatly increases your risk for illness    A healthy diet, regular physical exercise & weight monitoring are important for maintaining a healthy lifestyle    You may be retaining fluid if you have a history of heart failure or if you experience any of the following symptoms:  Weight gain of 3 pounds or more overnight or 5 pounds in a week, increased swelling in our hands or feet or shortness of breath while lying flat in bed.   Please call your doctor as soon as you notice any of these symptoms; do not wait until your next office visit. Recognize signs and symptoms of STROKE:    F-face looks uneven    A-arms unable to move or move unevenly    S-speech slurred or non-existent    T-time-call 911 as soon as signs and symptoms begin-DO NOT go       Back to bed or wait to see if you get better-TIME IS BRAIN. Warning Signs of HEART ATTACK     Call 911 if you have these symptoms:   Chest discomfort. Most heart attacks involve discomfort in the center of the chest that lasts more than a few minutes, or that goes away and comes back. It can feel like uncomfortable pressure, squeezing, fullness, or pain.  Discomfort in other areas of the upper body. Symptoms can include pain or discomfort in one or both arms, the back, neck, jaw, or stomach.  Shortness of breath with or without chest discomfort.  Other signs may include breaking out in a cold sweat, nausea, or lightheadedness. Don't wait more than five minutes to call 911 - MINUTES MATTER! Fast action can save your life. Calling 911 is almost always the fastest way to get lifesaving treatment. Emergency Medical Services staff can begin treatment when they arrive -- up to an hour sooner than if someone gets to the hospital by car. The discharge information has been reviewed with the patient and caregiver. The patient and caregiver verbalized understanding. Discharge medications reviewed with the patient and caregiver and appropriate educational materials and side effects teaching were provided.   ___________________________________________________________________________________________________________________________________  Patient or Representative Signature                                                          Date/Time

## 2017-12-15 NOTE — H&P (VIEW-ONLY)
Surgery Consult:  Umbilical hernia  Requesting physician:  Self referred    Subjective:   Patient 28 y.o.  female presents with umbilical bulge for 2 years. It's increasing in size. It hasn't been bothering until more recently. For the past 6 months, patient has been having intermittent sharp periumbilical pain without radiation especially with lifting. Patient also complains of intermittent nausea but no emesis. No recent trauma, but patient does lot of heavy lifting at work > 50 lbs. Patient also with mild constipation. Prior abdominal surgeries include  x 3. No F/C/S. No dysuria. Past Medical & Surgical History:  Past Medical History:   Diagnosis Date    Asthma      delivery     Headache     migraine    Hernia, umbilical 177    Psychiatric disorder     depression and anxiety      Past Surgical History:   Procedure Laterality Date    HX  SECTION  , ,     [facility] (Dr Leyda Veloz), [facility] (Dr Leyda Veloz)       Social History:  Social History     Social History    Marital status:      Spouse name: N/A    Number of children: N/A    Years of education: N/A     Occupational History    Not on file.      Social History Main Topics    Smoking status: Former Smoker     Packs/day: 0.25     Years: 5.00    Smokeless tobacco: Never Used    Alcohol use Yes      Comment: rarely    Drug use: Not on file    Sexual activity: Yes     Partners: Male     Other Topics Concern    Not on file     Social History Narrative        Family History:  Family History   Problem Relation Age of Onset    Diabetes Father     Hypertension Father     Diabetes Maternal Grandmother     Cancer Paternal Aunt      breast    Hypertension Paternal Grandmother     Diabetes Paternal Grandmother     Cancer Paternal Grandmother      lung    Cancer Paternal Grandfather      pancreas        Medications:  Current Outpatient Prescriptions   Medication Sig    OTHER methylprednisone 4 mg - migraines   Indications: Take daily    topiramate (TOPAMAX) 25 mg tablet Take  by mouth daily (with breakfast). Take only as needed after methylprednisone is finished.  Omeprazole delayed release (PRILOSEC D/R) 20 mg tablet Take 1 Tab by mouth daily.  ergocalciferol (ERGOCALCIFEROL) 50,000 unit capsule Take 1 Cap by mouth every seven (7) days.  escitalopram oxalate (LEXAPRO) 10 mg tablet Take 1 Tab by mouth daily.  traZODone (DESYREL) 50 mg tablet Take 1 Tab by mouth nightly.  ibuprofen (MOTRIN) 600 mg tablet Take 1 Tab by mouth every six (6) hours as needed for Pain. No current facility-administered medications for this visit. Allergies: Allergies   Allergen Reactions    Iodine Unknown (comments)     Topical only    Pcn [Penicillins] Other (comments)     Unknown her mother told her she was       Review of Systems  A comprehensive review of systems was negative except for that written in the HPI. Objective:     Exam:    Visit Vitals    /73 (BP 1 Location: Left arm, BP Patient Position: Sitting)    Pulse 71    Resp 16    Ht 5' 0.5\" (1.537 m)    Wt 93.1 kg (205 lb 3.2 oz)    SpO2 99%    BMI 39.42 kg/m2     General appearance: alert, cooperative, no distress, appears stated age  Eyes: no sclera icterus  Lungs: clear to auscultation bilaterally  Heart: regular rate and rhythm  Abdomen: soft, non-tender. Non-distended. Umbilical bulge, non-reducible, measuring 1 x 2 cm. Unable to appreciate the size of the defect. Extremities: extremities normal, atraumatic, no cyanosis or edema  Skin: Skin color, texture, turgor normal. No rashes or lesions. No jaundice. Neurologic: Grossly normal      Assessment:     Umbilical hernia, non-reducible  Morbid obesity (BMI 39)    Plan:     Incarcerated UHR  Risks, benefit, and alternative to surgery was discussed with the patient.   The risks of surgery include but not limited to infection, bleeding, intraabdominal organ injury, recurrence, and the risks of general anesthetic. Patient is agreeable to surgery. All questions answered.

## 2017-12-15 NOTE — ANESTHESIA PREPROCEDURE EVALUATION
Anesthetic History   No history of anesthetic complications            Review of Systems / Medical History  Patient summary reviewed, nursing notes reviewed and pertinent labs reviewed    Pulmonary            Asthma : well controlled       Neuro/Psych         Psychiatric history     Cardiovascular  Within defined limits                Exercise tolerance: >4 METS     GI/Hepatic/Renal  Within defined limits              Endo/Other        Morbid obesity     Other Findings            Physical Exam    Airway  Mallampati: II  TM Distance: > 6 cm  Neck ROM: normal range of motion   Mouth opening: Normal     Cardiovascular  Regular rate and rhythm,  S1 and S2 normal,  no murmur, click, rub, or gallop             Dental  No notable dental hx       Pulmonary  Breath sounds clear to auscultation               Abdominal  GI exam deferred       Other Findings            Anesthetic Plan    ASA: 2  Anesthesia type: general          Induction: Intravenous  Anesthetic plan and risks discussed with: Patient

## 2017-12-15 NOTE — ANESTHESIA POSTPROCEDURE EVALUATION
Post-Anesthesia Evaluation and Assessment    Patient: Dannielle Flores MRN: [id number]  SSN: [id number]    YOB: 1985  Age: 28 y.o. Sex: female       Cardiovascular Function/Vital Signs  Visit Vitals    /78 (BP 1 Location: Right arm, BP Patient Position: At rest)    Pulse 64    Temp 37 °C (98.6 °F)    Resp 12    Ht 5' (1.524 m)    Wt 93.5 kg (206 lb 2.1 oz)    SpO2 93%    BMI 40.26 kg/m2       Patient is status post general anesthesia for Procedure(s):  INCARCERATED UMBILICAL HERNIA REPAIR. Nausea/Vomiting: None    Postoperative hydration reviewed and adequate. Pain:  Pain Scale 1: Numeric (0 - 10) (12/15/17 1215)  Pain Intensity 1: 5 (12/15/17 1215)   Managed    Neurological Status:   Neuro (WDL): Exceptions to WDL (12/15/17 1125)  Neuro  Neurologic State: Drowsy; Eyes open spontaneously (12/15/17 1125)  Orientation Level: Oriented to person;Oriented to place (12/15/17 1125)  Cognition: Follows commands (12/15/17 1125)  Speech: Delayed responses (12/15/17 1125)  LUE Motor Response: Purposeful;Spontaneous  (12/15/17 1125)  LLE Motor Response: Purposeful;Spontaneous  (12/15/17 1125)  RUE Motor Response: Purposeful;Spontaneous  (12/15/17 1125)  RLE Motor Response: Purposeful;Spontaneous  (12/15/17 1125)   At baseline    Mental Status and Level of Consciousness: Arousable    Pulmonary Status:   O2 Device: Room air (12/15/17 1215)   Adequate oxygenation and airway patent    Complications related to anesthesia: None    Post-anesthesia assessment completed.  No concerns    Signed By: Jelena Soliz MD     December 15, 2017

## 2017-12-15 NOTE — PERIOP NOTES
Handoff Report from Operating Room to PACU    Report received from KAMAR Polanco RN and HERBERT Cabello CRNA regarding Marian Pedro. Surgeon(s):  Juanita Joe MD  And Procedure(s) (LRB):  INCARCERATED UMBILICAL HERNIA REPAIR (N/A)  confirmed   with allergies and dressings discussed. Anesthesia type, drugs, patient history, complications, estimated blood loss, vital signs, intake and output, and last pain medication, lines, reversal medications and temperature were reviewed.

## 2017-12-15 NOTE — OP NOTES
Patient ID:   Name: Iraj Common   Medical Record Number: [id number]   YOB: 1985    Date of Surgery: 12/15/2017     Preoperative Diagnosis:   Incarcerated umbilical hernia    Postoperative Diagnosis:   Incarcerated umbilical hernia    Procedure: Incarcerated umbilical hernia repair    Surgeon: Jennifer Reno MD     Anesthesia: General    Estimated Blood Loss:  2 cc    Indications:  Patient 28 y.o.  female presents with umbilical bulge for 2 years. It's increasing in size and complains of intermittent sharp periumbilical pain. On examination, patient noted to have non-reducible umbilical hernia and presents today for repair.      Procedure Details: The patient was seen in the Holding Room. The risks, benefits, complications, treatment options, and expected outcomes were discussed with the patient. The site of surgery properly noted/marked. After informed consent was performed, patient was taken to the operating room and was placed supine in the operating table. A Time Out was held and the above information confirmed. After establishing general anesthesia, abdomen was prepped and draped in standard surgical fashion. Small infraumbilical incision was made followed by dissection down to the fascia. After circumscribing umbilical stump, umbilical stump was  from the fascia using sharp dissection. Patient was found to have two 2-3 mm defect adjacent to each other with incarcerated fat. Attempt was made to reduce the hernia content but unsuccessful. The bridge between the two defect was divided but unable to reduce. Therefore the hernia contents were suture ligated. Of note, patient also noted to have 1 mm defect just above the umbilicus. Facial defects were closed with interrupted 0-0 Ethibond. Umbilical stump was then tacked to the fascia using 3-0 Vicryl. Skin was then closed using 4-0 Vicryl subcuticular stitch followed by Dermabond.        Instrument, sponge, and needle counts were correct prior to closure and at the conclusion of the case. The patient was taken to recovery room in good condition having tolerated the procedure well.           CC: Lex Leon MD

## 2018-01-02 ENCOUNTER — OFFICE VISIT (OUTPATIENT)
Dept: SURGERY | Age: 33
End: 2018-01-02

## 2018-01-02 VITALS
BODY MASS INDEX: 40.44 KG/M2 | HEART RATE: 71 BPM | SYSTOLIC BLOOD PRESSURE: 114 MMHG | HEIGHT: 60 IN | TEMPERATURE: 98.7 F | DIASTOLIC BLOOD PRESSURE: 78 MMHG | RESPIRATION RATE: 16 BRPM | OXYGEN SATURATION: 95 % | WEIGHT: 206 LBS

## 2018-01-02 DIAGNOSIS — Z09 POSTOPERATIVE EXAMINATION: Primary | ICD-10-CM

## 2018-01-02 RX ORDER — TOPIRAMATE 25 MG/1
TABLET ORAL
Refills: 1 | COMMUNITY
Start: 2017-11-14 | End: 2019-09-14

## 2018-01-02 RX ORDER — DEXAMETHASONE SODIUM PHOSPHATE 1 MG/ML
SOLUTION/ DROPS OPHTHALMIC
Refills: 1 | COMMUNITY
Start: 2017-11-03 | End: 2019-09-14

## 2018-01-02 NOTE — PROGRESS NOTES
Chief Complaint   Patient presents with    Surgical Follow-up     Patient is here for her post op f/u for the umbilical hernia      1. Have you been to the ER, urgent care clinic since your last visit? Hospitalized since your last visit? No     2. Have you seen or consulted any other health care providers outside of the [address] [place] [address] since your last visit? Include any pap smears or colon screening.   No     Visit Vitals    /78 (BP 1 Location: Right arm, BP Patient Position: Sitting)    Pulse 71    Temp 98.7 °F (37.1 °C) (Oral)    Resp 16    Ht 5' (1.524 m)    Wt 206 lb (93.4 kg)    SpO2 95%    BMI 40.23 kg/m2

## 2018-01-02 NOTE — MR AVS SNAPSHOT
Visit Information Date & Time Provider Department Dept. Phone Encounter #  
 1/2/2018  9:20 AM Elian Lowe MD [facility] [id number] Upcoming Health Maintenance Date Due DTaP/Tdap/Td series (1 - Tdap) 11/5/2006 PAP AKA CERVICAL CYTOLOGY 11/5/2006 Influenza Age 5 to Adult 8/1/2017 Allergies as of 1/2/2018  Review Complete On: 1/2/2018 By: Elian Lowe MD  
  
 Severity Noted Reaction Type Reactions Iodine  11/28/2017    Unknown (comments) Topical only Pcn [Penicillins]  08/21/2012    Other (comments) Unknown her mother told her she was Current Immunizations  Never Reviewed Name Date Influenza Vaccine PF 9/23/2015 Not reviewed this visit You Were Diagnosed With   
  
 Codes Comments Postoperative examination    -  Primary ICD-10-CM: Z75 ICD-9-CM: V67.00 Vitals BP Pulse Temp Resp Height(growth percentile) Weight(growth percentile) 114/78 (BP 1 Location: Right arm, BP Patient Position: Sitting) 71 98.7 °F (37.1 °C) (Oral) 16 5' (1.524 m) 206 lb (93.4 kg) SpO2 BMI OB Status Smoking Status 95% 40.23 kg/m2 Having regular periods Former Smoker Vitals History BMI and BSA Data Body Mass Index Body Surface Area  
 40.23 kg/m 2 1.99 m 2 Preferred Pharmacy Pharmacy Name Phone [id number] [place] [phone] Your Updated Medication List  
  
   
This list is accurate as of: 1/2/18  1:55 PM.  Always use your most recent med list.  
  
  
  
  
 dexamethasone 0.1 % ophthalmic solution Commonly known as:  DECADRON  
INSTILL 1 DROP INTO THE AFFECTED EYE 3 TIMES A DAY  
  
 docusate sodium 100 mg capsule Commonly known as:  Guerda Edwards Take 1 Cap by mouth two (2) times a day. HYDROcodone-acetaminophen 5-325 mg per tablet Commonly known as:  Jed Cabrera  
 Take 1-2 Tabs by mouth every four (4) hours as needed for Pain. Max Daily Amount: 12 Tabs. methylPREDNISolone 4 mg Tab Commonly known as:  MEDROL Take  by mouth daily (with breakfast). topiramate 25 mg tablet Commonly known as:  TOPAMAX TAKE 1 TABLET BY MOUTH EVERY DAY **MAY INCREASE BY 1 TAB WEEKLY TO 2 TABS TWICE A DAY AS DIRECTED ** Introducing [facility]! Nicki Regalado introduces [employer] patient portal. Now you can access parts of your medical record, email your doctor's office, and request medication refills online. 1. In your internet browser, go to [email or web address] 2. Click on the First Time User? Click Here link in the Sign In box. You will see the New Member Sign Up page. 3. Enter your [employer] Access Code exactly as it appears below. You will not need to use this code after youve completed the sign-up process. If you do not sign up before the expiration date, you must request a new code. · [employer] Access Code: [email or web address] Expires: 2/26/2018 11:00 AM 
 
4. Enter the last four digits of your Social Security Number (xxxx) and Date of Birth (mm/dd/yyyy) as indicated and click Submit. You will be taken to the next sign-up page. 5. Create a [employer] ID. This will be your [employer] login ID and cannot be changed, so think of one that is secure and easy to remember. 6. Create a [employer] password. You can change your password at any time. 7. Enter your Password Reset Question and Answer. This can be used at a later time if you forget your password. 8. Enter your e-mail address. You will receive e-mail notification when new information is available in [address] 9. Click Sign Up. You can now view and download portions of your medical record. 10. Click the Download Summary menu link to download a portable copy of your medical information.  
 
If you have questions, please visit the Frequently Asked Questions section of the [email or web address]. Remember, [employer] is NOT to be used for urgent needs. For medical emergencies, dial 911. Now available from your iPhone and Android! Please provide this summary of care documentation to your next provider. Your primary care clinician is listed as Otis Guerrero. If you have any questions after today's visit, please call [phone].

## 2018-01-02 NOTE — PROGRESS NOTES
Patient is here for follow-up. Patient underwent UHR on 12/15/17. Doing well. No complaints. PE:  Visit Vitals    /78 (BP 1 Location: Right arm, BP Patient Position: Sitting)    Pulse 71    Temp 98.7 °F (37.1 °C) (Oral)    Resp 16    Ht 5' (1.524 m)    Wt 93.4 kg (206 lb)    SpO2 95%    BMI 40.23 kg/m2     Abdomen:  [employer] C/D/I.     Assessment:  S/p UHR    Plan:  -f/u prn

## 2018-03-19 ENCOUNTER — OFFICE VISIT (OUTPATIENT)
Dept: INTERNAL MEDICINE CLINIC | Age: 33
End: 2018-03-19

## 2018-03-19 VITALS
WEIGHT: 212 LBS | HEIGHT: 60 IN | OXYGEN SATURATION: 98 % | RESPIRATION RATE: 16 BRPM | DIASTOLIC BLOOD PRESSURE: 76 MMHG | TEMPERATURE: 98 F | SYSTOLIC BLOOD PRESSURE: 114 MMHG | HEART RATE: 74 BPM | BODY MASS INDEX: 41.62 KG/M2

## 2018-03-19 DIAGNOSIS — S39.012A STRAIN OF LUMBAR PARASPINOUS MUSCLE, INITIAL ENCOUNTER: Primary | ICD-10-CM

## 2018-03-19 DIAGNOSIS — Z23 ENCOUNTER FOR IMMUNIZATION: ICD-10-CM

## 2018-03-19 DIAGNOSIS — E66.01 MORBID OBESITY WITH BODY MASS INDEX (BMI) OF 40.0 TO 49.9 (HCC): Chronic | ICD-10-CM

## 2018-03-19 RX ORDER — DICLOFENAC SODIUM 75 MG/1
TABLET, DELAYED RELEASE ORAL
COMMUNITY
End: 2019-09-14

## 2018-03-19 RX ORDER — DIAZEPAM 2 MG/1
2-4 TABLET ORAL
Qty: 20 TAB | Refills: 0 | Status: SHIPPED | OUTPATIENT
Start: 2018-03-19 | End: 2019-09-14

## 2018-03-19 RX ORDER — METHOCARBAMOL 750 MG/1
TABLET, FILM COATED ORAL 4 TIMES DAILY
COMMUNITY
End: 2019-09-14

## 2018-03-19 NOTE — MR AVS SNAPSHOT
[address] [facility] 
[phone] Patient: Aaron Vargas MRN: [id number] Visit Information Date & Time Provider Department Dept. Phone Encounter #  
 3/19/2018  9:30 AM Francis Miller, [address] [place] [address] [phone] Follow-up Instructions Return if symptoms worsen or fail to improve. Upcoming Health Maintenance Date Due DTaP/Tdap/Td series (1 - Tdap) 11/5/2006 PAP AKA CERVICAL CYTOLOGY 11/5/2006 Influenza Age 5 to Adult 8/1/2017 Allergies as of 3/19/2018  Review Complete On: 3/19/2018 By: Lake Addison III, DO Severity Noted Reaction Type Reactions Iodine  11/28/2017    Unknown (comments) Topical only Pcn [Penicillins]  08/21/2012    Other (comments) Unknown her mother told her she was Current Immunizations  Never Reviewed Name Date Influenza Vaccine PF 9/23/2015 Not reviewed this visit You Were Diagnosed With   
  
 Codes Comments Strain of lumbar paraspinous muscle, initial encounter    -  Primary ICD-10-CM: B82.145I ICD-9-CM: 847.2 Morbid obesity with body mass index (BMI) of 40.0 to 49.9 (HCC)     ICD-10-CM: E66.01 
ICD-9-CM: 278.01 Vitals BP Pulse Temp Resp Height(growth percentile) Weight(growth percentile) 114/76 (BP 1 Location: Left arm, BP Patient Position: Sitting) 74 98 °F (36.7 °C) (Oral) 16 5' (1.524 m) 212 lb (96.2 kg) LMP SpO2 BMI OB Status Smoking Status 03/05/2018 98% 41.4 kg/m2 Having regular periods Former Smoker Vitals History BMI and BSA Data Body Mass Index Body Surface Area  
 41.4 kg/m 2 2.02 m 2 Preferred Pharmacy Pharmacy Name Phone [id number] Leita Shone, [address] [facility] [phone] Your Updated Medication List  
  
   
 This list is accurate as of 3/19/18 10:45 AM.  Always use your most recent med list.  
  
  
  
  
 dexamethasone 0.1 % ophthalmic solution Commonly known as:  DECADRON  
INSTILL 1 DROP INTO THE AFFECTED EYE 3 TIMES A DAY  
  
 diazePAM 2 mg tablet Commonly known as:  VALIUM Take 1-2 Tabs by mouth nightly as needed. Max Daily Amount: 4 mg. Indications: Muscle Spasm  
  
 diclofenac EC 75 mg EC tablet Commonly known as:  VOLTAREN Take  by mouth. docusate sodium 100 mg capsule Commonly known as:  Bree Finner Take 1 Cap by mouth two (2) times a day. HYDROcodone-acetaminophen 5-325 mg per tablet Commonly known as:  [facility] Take 1-2 Tabs by mouth every four (4) hours as needed for Pain. Max Daily Amount: 12 Tabs. methocarbamol 750 mg tablet Commonly known as:  ROBAXIN Take  by mouth four (4) times daily. methylPREDNISolone 4 mg Tab Commonly known as:  MEDROL Take  by mouth daily (with breakfast). topiramate 25 mg tablet Commonly known as:  TOPAMAX TAKE 1 TABLET BY MOUTH EVERY DAY **MAY INCREASE BY 1 TAB WEEKLY TO 2 TABS TWICE A DAY AS DIRECTED **  
  
  
  
  
Prescriptions Printed Refills  
 diazePAM (VALIUM) 2 mg tablet 0 Sig: Take 1-2 Tabs by mouth nightly as needed. Max Daily Amount: 4 mg. Indications: Muscle Spasm Class: Print Route: Oral  
  
Follow-up Instructions Return if symptoms worsen or fail to improve. Patient Instructions Back Strain: Care Instructions Your Care Instructions Back strain happens when you overstretch, or pull, a muscle in your back. You may hurt your back in an accident or when you exercise or lift something. Most back pain will get better with rest and time. You can take care of yourself at home to help your back heal. 
Follow-up care is a key part of your treatment and safety.  Be sure to make and go to all appointments, and call your doctor if you are having problems. It's also a good idea to know your test results and keep a list of the medicines you take. How can you care for yourself at home? · Try to stay as active as you can, but stop or reduce any activity that causes pain. · Put ice or a cold pack on the sore muscle for 10 to 20 minutes at a time to stop swelling. Try this every 1 to 2 hours for 3 days (when you are awake) or until the swelling goes down. Put a thin cloth between the ice pack and your skin. · After 2 or 3 days, apply a heating pad on low or a warm cloth to your back. Some doctors suggest that you go back and forth between hot and cold treatments. · Take pain medicines exactly as directed. ¨ If the doctor gave you a prescription medicine for pain, take it as prescribed. ¨ If you are not taking a prescription pain medicine, ask your doctor if you can take an over-the-counter medicine. · Try sleeping on your side with a pillow between your legs. Or put a pillow under your knees when you lie on your back. These measures can ease pain in your lower back. · Return to your usual level of activity slowly. When should you call for help? Call 911 anytime you think you may need emergency care. For example, call if: 
? · You are unable to move a leg at all. ?Call your doctor now or seek immediate medical care if: 
? · You have new or worse symptoms in your legs, belly, or buttocks. Symptoms may include: ¨ Numbness or tingling. ¨ Weakness. ¨ Pain. ? · You lose bladder or bowel control. ? Watch closely for changes in your health, and be sure to contact your doctor if you are not getting better as expected. Where can you learn more? Go to http://stu-telly.info/. Enter T188 in the search box to learn more about \"Back Strain: Care Instructions. \" Current as of: March 21, 2017 Content Version: 11.4 © 4392-5907 Healthwise, Incorporated.  Care instructions adapted under license by [address] (which disclaims liability or warranty for this information). If you have questions about a medical condition or this instruction, always ask your healthcare professional. [address] any warranty or liability for your use of this information. Low Back Pain: Exercises Your Care Instructions Here are some examples of typical rehabilitation exercises for your condition. Start each exercise slowly. Ease off the exercise if you start to have pain. Your doctor or physical therapist will tell you when you can start these exercises and which ones will work best for you. How to do the exercises Press-up 1. Lie on your stomach, supporting your body with your forearms. 2. Press your elbows down into the floor to raise your upper back. As you do this, relax your stomach muscles and allow your back to arch without using your back muscles. As your press up, do not let your hips or pelvis come off the floor. 3. Hold for 15 to 30 seconds, then relax. 4. Repeat 2 to 4 times. Alternate arm and leg (bird dog) exercise Do this exercise slowly. Try to keep your body straight at all times, and do not let one hip drop lower than the other. 1. Start on the floor, on your hands and knees. 2. Tighten your belly muscles. 3. Raise one leg off the floor, and hold it straight out behind you. Be careful not to let your hip drop down, because that will twist your trunk. 4. Hold for about 6 seconds, then lower your leg and switch to the other leg. 5. Repeat 8 to 12 times on each leg. 6. Over time, work up to holding for 10 to 30 seconds each time. 7. If you feel stable and secure with your leg raised, try raising the opposite arm straight out in front of you at the same time. Knee-to-chest exercise 1. Lie on your back with your knees bent and your feet flat on the floor.  
2. Bring one knee to your chest, keeping the other foot flat on the floor (or keeping the other leg straight, whichever feels better on your lower back). 3. Keep your lower back pressed to the floor. Hold for at least 15 to 30 seconds. 4. Relax, and lower the knee to the starting position. 5. Repeat with the other leg. Repeat 2 to 4 times with each leg. 6. To get more stretch, put your other leg flat on the floor while pulling your knee to your chest. 
Curl-ups 1. Lie on the floor on your back with your knees bent at a 90-degree angle. Your feet should be flat on the floor, about 12 inches from your buttocks. 2. Cross your arms over your chest. If this bothers your neck, try putting your hands behind your neck (not your head), with your elbows spread apart. 3. Slowly tighten your belly muscles and raise your shoulder blades off the floor. 4. Keep your head in line with your body, and do not press your chin to your chest. 
5. Hold this position for 1 or 2 seconds, then slowly lower yourself back down to the floor. 6. Repeat 8 to 12 times. Pelvic tilt exercise 1. Lie on your back with your knees bent. 2. \"Brace\" your stomach. This means to tighten your muscles by pulling in and imagining your belly button moving toward your spine. You should feel like your back is pressing to the floor and your hips and pelvis are rocking back. 3. Hold for about 6 seconds while you breathe smoothly. 4. Repeat 8 to 12 times. Heel dig bridging 1. Lie on your back with both knees bent and your ankles bent so that only your heels are digging into the floor. Your knees should be bent about 90 degrees. 2. Then push your heels into the floor, squeeze your buttocks, and lift your hips off the floor until your shoulders, hips, and knees are all in a straight line. 3. Hold for about 6 seconds as you continue to breathe normally, and then slowly lower your hips back down to the floor and rest for up to 10 seconds. 4. Do 8 to 12 repetitions. Hamstring stretch in doorway 1. Lie on your back in a doorway, with one leg through the open door. 2. Slide your leg up the wall to straighten your knee. You should feel a gentle stretch down the back of your leg. 3. Hold the stretch for at least 15 to 30 seconds. Do not arch your back, point your toes, or bend either knee. Keep one heel touching the floor and the other heel touching the wall. 4. Repeat with your other leg. 5. Do 2 to 4 times for each leg. Hip flexor stretch 1. Kneel on the floor with one knee bent and one leg behind you. Place your forward knee over your foot. Keep your other knee touching the floor. 2. Slowly push your hips forward until you feel a stretch in the upper thigh of your rear leg. 3. Hold the stretch for at least 15 to 30 seconds. Repeat with your other leg. 4. Do 2 to 4 times on each side. Wall sit 1. Stand with your back 10 to 12 inches away from a wall. 2. Lean into the wall until your back is flat against it. 3. Slowly slide down until your knees are slightly bent, pressing your lower back into the wall. 4. Hold for about 6 seconds, then slide back up the wall. 5. Repeat 8 to 12 times. Follow-up care is a key part of your treatment and safety. Be sure to make and go to all appointments, and call your doctor if you are having problems. It's also a good idea to know your test results and keep a list of the medicines you take. Where can you learn more? Go to http://stu-telly.info/. Enter H363 in the search box to learn more about \"Low Back Pain: Exercises. \" Current as of: March 21, 2017 Content Version: 11.4 © 3522-4629 [employer]. Care instructions adapted under license by [employer] (which disclaims liability or warranty for this information).  If you have questions about a medical condition or this instruction, always ask your healthcare professional. [place] [address] any warranty or liability for your use of this information. Use ice to low back area for 15 minutes twice a day. Use the valium at bedtime, as it will likely make you very sleepy. Continue with diclofenac. If not better in a week, let us know, and we will have you see physical therapy. Introducing [facility]! [employer] introduces [employer] patient portal. Now you can access parts of your medical record, email your doctor's office, and request medication refills online. 1. In your internet browser, go to [email or web address] 2. Click on the First Time User? Click Here link in the Sign In box. You will see the New Member Sign Up page. 3. Enter your [employer] Access Code exactly as it appears below. You will not need to use this code after youve completed the sign-up process. If you do not sign up before the expiration date, you must request a new code. · [employer] Access Code: [email or web address] Expires: 6/17/2018 10:45 AM 
 
4. Enter the last four digits of your Social Security Number (xxxx) and Date of Birth (mm/dd/yyyy) as indicated and click Submit. You will be taken to the next sign-up page. 5. Create a [employer] ID. This will be your [employer] login ID and cannot be changed, so think of one that is secure and easy to remember. 6. Create a [employer] password. You can change your password at any time. 7. Enter your Password Reset Question and Answer. This can be used at a later time if you forget your password. 8. Enter your e-mail address. You will receive e-mail notification when new information is available in [address] 9. Click Sign Up. You can now view and download portions of your medical record. 10. Click the Download Summary menu link to download a portable copy of your medical information. If you have questions, please visit the Frequently Asked Questions section of the [employer] website. Remember, [employer] is NOT to be used for urgent needs. For medical emergencies, dial 911. Now available from your iPhone and Android! Please provide this summary of care documentation to your next provider. Your primary care clinician is listed as Richmond Lemon. If you have any questions after today's visit, please call [phone].

## 2018-03-19 NOTE — PROGRESS NOTES
Reviewed record in preparation for visit and have obtained necessary documentation. Identified pt with two pt identifiers(name and ). Chief Complaint   Patient presents with    LOW BACK PAIN     x3 weeks       Health Maintenance Due   Topic Date Due    DTaP/Tdap/Td series (1 - Tdap) 2006    PAP AKA CERVICAL CYTOLOGY  2006    Influenza Age 5 to Adult  2017       Ms. Maryclare Gosselin has a reminder for a \"due or due soon\" health maintenance. I have asked that she discuss health maintenance topic(s) due with Her  primary care provider. Coordination of Care Questionnaire:  :     1) Have you been to an emergency room, urgent care clinic since your last visit? yes   Hospitalized since your last visit? no             2) Have you seen or consulted any other health care providers outside of [address] [place] [address] since your last visit? no  (Include any pap smears or colon screenings in this section.)    3) Do you have an Advance Directive on file? no    4) Are you interested in receiving information on Advance Directives? NO    Patient is accompanied by self I have received verbal consent from Edwin Dowell to discuss any/all medical information while they are present in the room.

## 2018-03-19 NOTE — PROGRESS NOTES
Alicia Espinosa is a 28 y.o. female who presents for evaluation of left sided low back pain with some radiation to left hip. Onset about 3 weeks ago. Denies any trauma. Was taking ibuprofen or tylenol, with no relief. Went to [facility] ED over the weekend, rx given for robaxin and diclofenac. No relief yet. No bowel or bladder issues. Last saw dr Oswaldo Mendoza here 2015.       ROS:  Constitutional: negative for fevers, chills, anorexia and weight loss  Eyes:   negative for visual disturbance and irritation  ENT:   negative for tinnitus,sore throat,nasal congestion,ear pain,hoarseness  Respiratory:  negative for cough, hemoptysis, dyspnea,wheezing  CV:   negative for chest pain, palpitations, lower extremity edema  GI:   negative for nausea, vomiting, diarrhea, abdominal pain,melena  Genitourinary: negative for frequency, dysuria and hematuria  Musculoskel: negative for myalgias, arthralgias,  muscle weakness, joint pain.  ++low back pain with some radiation to left hip  Neurological:  negative for headaches, dizziness, focal weakness, numbness  Psychiatric:     Negative for depression or anxiety      Past Medical History:   Diagnosis Date    Asthma     as a child     delivery     Headache     migraine    Hernia, umbilical 1877    Psychiatric disorder     depression and anxiety       Past Surgical History:   Procedure Laterality Date    HX  SECTION  , ,     [facility] (Dr Selma Meredith), [facility] (Dr Selma Meredith)       Family History   Problem Relation Age of Onset    Diabetes Father     Hypertension Father     Diabetes Maternal Grandmother     Cancer Paternal Aunt      breast    Hypertension Paternal Grandmother     Diabetes Paternal Grandmother     Cancer Paternal Grandmother      lung    Cancer Paternal Grandfather      pancreas       Social History     Social History    Marital status:      Spouse name: N/A    Number of children: N/A    Years of education: N/A     Occupational History    Not on file. Social History Main Topics    Smoking status: Former Smoker     Packs/day: 0.25     Years: 5.00    Smokeless tobacco: Never Used    Alcohol use 1.2 oz/week     2 Cans of beer per week      Comment: rarely    Drug use: No    Sexual activity: Yes     Partners: Male     Other Topics Concern    Not on file     Social History Narrative            Visit Vitals    /76 (BP 1 Location: Left arm, BP Patient Position: Sitting)    Pulse 74    Temp 98 °F (36.7 °C) (Oral)    Resp 16    Ht 5' (1.524 m)    Wt 212 lb (96.2 kg)    LMP 03/05/2018    SpO2 98%    BMI 41.4 kg/m2       Physical Examination:   General - Well appearing female  HEENT - PERRL, TM no erythema/opacification, normal nasal turbinates, no oropharyngeal erythema or exudate, MMM  Neck - supple, no bruits, no thyroidomegaly, no lymphadenopathy  Pulm - clear to auscultation bilaterally  Cardio - RRR, normal S1 S2, no murmur  Abd - soft, nontender, no masses, no HSM  Extrem - no edema, +2 distal pulses  Neuro-  No focal deficits, CN intact     Assessment/Plan:    1. Lumbar strain--continue diclofenac.  rx given for valium 2-4 mg qhs prn. Add ice 15 minutes bid. Stretches given. Declines PT at this time, but if not better in a week, she will notify us and will refer to PT then. 2.  Morbid obesity--not helping with her back pain    Asked her to make appt to see dr Christian Cazares for cpe, last time saw her was nov 2015.         Jennifer Endo III, DO

## 2018-03-19 NOTE — PATIENT INSTRUCTIONS
Back Strain: Care Instructions  Your Care Instructions    Back strain happens when you overstretch, or pull, a muscle in your back. You may hurt your back in an accident or when you exercise or lift something. Most back pain will get better with rest and time. You can take care of yourself at home to help your back heal.  Follow-up care is a key part of your treatment and safety. Be sure to make and go to all appointments, and call your doctor if you are having problems. It's also a good idea to know your test results and keep a list of the medicines you take. How can you care for yourself at home? · Try to stay as active as you can, but stop or reduce any activity that causes pain. · Put ice or a cold pack on the sore muscle for 10 to 20 minutes at a time to stop swelling. Try this every 1 to 2 hours for 3 days (when you are awake) or until the swelling goes down. Put a thin cloth between the ice pack and your skin. · After 2 or 3 days, apply a heating pad on low or a warm cloth to your back. Some doctors suggest that you go back and forth between hot and cold treatments. · Take pain medicines exactly as directed. ¨ If the doctor gave you a prescription medicine for pain, take it as prescribed. ¨ If you are not taking a prescription pain medicine, ask your doctor if you can take an over-the-counter medicine. · Try sleeping on your side with a pillow between your legs. Or put a pillow under your knees when you lie on your back. These measures can ease pain in your lower back. · Return to your usual level of activity slowly. When should you call for help? Call 911 anytime you think you may need emergency care. For example, call if:  ? · You are unable to move a leg at all. ?Call your doctor now or seek immediate medical care if:  ? · You have new or worse symptoms in your legs, belly, or buttocks. Symptoms may include:  ¨ Numbness or tingling. ¨ Weakness. ¨ Pain.    ? · You lose bladder or bowel control. ? Watch closely for changes in your health, and be sure to contact your doctor if you are not getting better as expected. Where can you learn more? Go to http://stu-telly.info/. Enter D174 in the search box to learn more about \"Back Strain: Care Instructions. \"  Current as of: March 21, 2017  Content Version: 11.4  © 4720-6821 [employer]. Care instructions adapted under license by [employer] (which disclaims liability or warranty for this information). If you have questions about a medical condition or this instruction, always ask your healthcare professional. [place] [address] any warranty or liability for your use of this information. Low Back Pain: Exercises  Your Care Instructions  Here are some examples of typical rehabilitation exercises for your condition. Start each exercise slowly. Ease off the exercise if you start to have pain. Your doctor or physical therapist will tell you when you can start these exercises and which ones will work best for you. How to do the exercises  Press-up    1. Lie on your stomach, supporting your body with your forearms. 2. Press your elbows down into the floor to raise your upper back. As you do this, relax your stomach muscles and allow your back to arch without using your back muscles. As your press up, do not let your hips or pelvis come off the floor. 3. Hold for 15 to 30 seconds, then relax. 4. Repeat 2 to 4 times. Alternate arm and leg (bird dog) exercise    Do this exercise slowly. Try to keep your body straight at all times, and do not let one hip drop lower than the other. 1. Start on the floor, on your hands and knees. 2. Tighten your belly muscles. 3. Raise one leg off the floor, and hold it straight out behind you. Be careful not to let your hip drop down, because that will twist your trunk.   4. Hold for about 6 seconds, then lower your leg and switch to the other leg.  5. Repeat 8 to 12 times on each leg. 6. Over time, work up to holding for 10 to 30 seconds each time. 7. If you feel stable and secure with your leg raised, try raising the opposite arm straight out in front of you at the same time. Knee-to-chest exercise    1. Lie on your back with your knees bent and your feet flat on the floor. 2. Bring one knee to your chest, keeping the other foot flat on the floor (or keeping the other leg straight, whichever feels better on your lower back). 3. Keep your lower back pressed to the floor. Hold for at least 15 to 30 seconds. 4. Relax, and lower the knee to the starting position. 5. Repeat with the other leg. Repeat 2 to 4 times with each leg. 6. To get more stretch, put your other leg flat on the floor while pulling your knee to your chest.  Curl-ups    1. Lie on the floor on your back with your knees bent at a 90-degree angle. Your feet should be flat on the floor, about 12 inches from your buttocks. 2. Cross your arms over your chest. If this bothers your neck, try putting your hands behind your neck (not your head), with your elbows spread apart. 3. Slowly tighten your belly muscles and raise your shoulder blades off the floor. 4. Keep your head in line with your body, and do not press your chin to your chest.  5. Hold this position for 1 or 2 seconds, then slowly lower yourself back down to the floor. 6. Repeat 8 to 12 times. Pelvic tilt exercise    1. Lie on your back with your knees bent. 2. \"Brace\" your stomach. This means to tighten your muscles by pulling in and imagining your belly button moving toward your spine. You should feel like your back is pressing to the floor and your hips and pelvis are rocking back. 3. Hold for about 6 seconds while you breathe smoothly. 4. Repeat 8 to 12 times. Heel dig bridging    1. Lie on your back with both knees bent and your ankles bent so that only your heels are digging into the floor.  Your knees should be bent about 90 degrees. 2. Then push your heels into the floor, squeeze your buttocks, and lift your hips off the floor until your shoulders, hips, and knees are all in a straight line. 3. Hold for about 6 seconds as you continue to breathe normally, and then slowly lower your hips back down to the floor and rest for up to 10 seconds. 4. Do 8 to 12 repetitions. Hamstring stretch in doorway    1. Lie on your back in a doorway, with one leg through the open door. 2. Slide your leg up the wall to straighten your knee. You should feel a gentle stretch down the back of your leg. 3. Hold the stretch for at least 15 to 30 seconds. Do not arch your back, point your toes, or bend either knee. Keep one heel touching the floor and the other heel touching the wall. 4. Repeat with your other leg. 5. Do 2 to 4 times for each leg. Hip flexor stretch    1. Kneel on the floor with one knee bent and one leg behind you. Place your forward knee over your foot. Keep your other knee touching the floor. 2. Slowly push your hips forward until you feel a stretch in the upper thigh of your rear leg. 3. Hold the stretch for at least 15 to 30 seconds. Repeat with your other leg. 4. Do 2 to 4 times on each side. Wall sit    1. Stand with your back 10 to 12 inches away from a wall. 2. Lean into the wall until your back is flat against it. 3. Slowly slide down until your knees are slightly bent, pressing your lower back into the wall. 4. Hold for about 6 seconds, then slide back up the wall. 5. Repeat 8 to 12 times. Follow-up care is a key part of your treatment and safety. Be sure to make and go to all appointments, and call your doctor if you are having problems. It's also a good idea to know your test results and keep a list of the medicines you take. Where can you learn more? Go to http://stu-telly.info/. Enter Y866 in the search box to learn more about \"Low Back Pain: Exercises. \"  Current as of: March 21, 2017  Content Version: 11.4  © 5534-2615 [employer]. Care instructions adapted under license by [employer] (which disclaims liability or warranty for this information). If you have questions about a medical condition or this instruction, always ask your healthcare professional. [address] any warranty or liability for your use of this information. Use ice to low back area for 15 minutes twice a day. Use the valium at bedtime, as it will likely make you very sleepy. Continue with diclofenac. If not better in a week, let us know, and we will have you see physical therapy.

## 2019-09-14 ENCOUNTER — HOSPITAL ENCOUNTER (EMERGENCY)
Age: 34
Discharge: HOME | End: 2019-09-14
Attending: EMERGENCY MEDICINE
Payer: COMMERCIAL

## 2019-09-14 VITALS
RESPIRATION RATE: 18 BRPM | HEART RATE: 70 BPM | TEMPERATURE: 97.9 F | DIASTOLIC BLOOD PRESSURE: 70 MMHG | HEIGHT: 60 IN | SYSTOLIC BLOOD PRESSURE: 118 MMHG | BODY MASS INDEX: 41.16 KG/M2 | OXYGEN SATURATION: 99 % | WEIGHT: 209.66 LBS

## 2019-09-14 DIAGNOSIS — R20.0 NUMBNESS AND TINGLING OF RIGHT LOWER EXTREMITY: Primary | ICD-10-CM

## 2019-09-14 DIAGNOSIS — R20.2 NUMBNESS AND TINGLING OF RIGHT LOWER EXTREMITY: Primary | ICD-10-CM

## 2019-09-14 PROCEDURE — 99282 EMERGENCY DEPT VISIT SF MDM: CPT

## 2019-09-14 RX ORDER — GABAPENTIN 100 MG/1
100 CAPSULE ORAL 3 TIMES DAILY
Qty: 18 CAP | Refills: 0 | Status: SHIPPED | OUTPATIENT
Start: 2019-09-14 | End: 2020-03-19

## 2019-09-14 RX ORDER — PHENTERMINE HYDROCHLORIDE 15 MG/1
15 CAPSULE ORAL
COMMUNITY
End: 2020-03-19

## 2019-09-14 RX ORDER — PREDNISONE 10 MG/1
TABLET ORAL
Qty: 21 TAB | Refills: 0 | Status: SHIPPED | OUTPATIENT
Start: 2019-09-14 | End: 2020-01-22 | Stop reason: ALTCHOICE

## 2019-09-14 NOTE — ED PROVIDER NOTES
EMERGENCY DEPARTMENT HISTORY AND PHYSICAL EXAM      Date: 2019  Patient Name: Jose Guzman    History of Presenting Illness     Chief Complaint   Patient presents with    Numbness     pt presents with c/o numbness and tingling in right leg x1 month. pt also c/o sharp shooting pain in leg. pt denied injury       History Provided By: Patient    HPI: Jose Guzman, 35 y.o. female presents ambulatory to the ED with cc of 1 month of 6 out of 10 essentially constant aching and numbness of the right lower extremity that is worse with weightbearing and for which there has been no specific injury. She does have a history of lower back pain for which she has visited orthopedics and told she had a curve in her spine. She denies saddle anesthesia or any bowel or bladder symptoms. Has been no fever recently. There are no other complaints, changes, or physical findings at this time. PCP: Abbie Mathews, DO    Current Outpatient Medications   Medication Sig Dispense Refill    phentermine (ADIPEX_P) 15 mg capsule Take 15 mg by mouth every morning.  predniSONE (STERAPRED DS) 10 mg dose pack Per Dose Pack instructions 21 Tab 0    gabapentin (NEURONTIN) 100 mg capsule Take 1 Cap by mouth three (3) times daily.  Max Daily Amount: 300 mg. 18 Cap 0     Past History     Past Medical History:  Past Medical History:   Diagnosis Date    Asthma     as a child     delivery     Headache     migraine    Hernia, umbilical     Psychiatric disorder     depression and anxiety       Past Surgical History:  Past Surgical History:   Procedure Laterality Date    HX  SECTION  , ,     [facility] (Dr Ragini Valles), [facility] (Dr Ragini Valles)       Family History:  Family History   Problem Relation Age of Onset    Diabetes Father     Hypertension Father     Diabetes Maternal Grandmother     Cancer Paternal Aunt         breast    Hypertension Paternal Grandmother     Diabetes Paternal Grandmother  Cancer Paternal Grandmother         lung    Cancer Paternal Grandfather         pancreas       Social History:  Social History     Tobacco Use    Smoking status: Former Smoker     Packs/day: 0.25     Years: 5.00     Pack years: 1.25    Smokeless tobacco: Never Used   Substance Use Topics    Alcohol use: Yes     Alcohol/week: 2.0 standard drinks     Types: 2 Cans of beer per week     Comment: rarely    Drug use: No       Allergies: Allergies   Allergen Reactions    Iodine Unknown (comments)     Topical only    Pcn [Penicillins] Other (comments)     Unknown her mother told her she was     Review of Systems   Review of Systems   Constitutional: Negative for fatigue and fever. HENT: Negative for ear pain and sore throat. Eyes: Negative for pain, redness and visual disturbance. Respiratory: Negative for cough and shortness of breath. Cardiovascular: Negative for chest pain and palpitations. Gastrointestinal: Negative for abdominal pain, nausea and vomiting. Genitourinary: Negative for dysuria, frequency and urgency. Musculoskeletal: Negative for back pain, gait problem, neck pain and neck stiffness. Skin: Negative for rash and wound. Neurological: Positive for numbness (And tingling of the right leg). Negative for dizziness, weakness, light-headedness and headaches. Physical Exam   Physical Exam   Constitutional: She is oriented to person, place, and time. She appears well-developed and well-nourished. Non-toxic appearance. No distress. HENT:   Head: Normocephalic and atraumatic. Right Ear: External ear normal.   Left Ear: External ear normal.   Nose: Nose normal.   Mouth/Throat: Uvula is midline. No trismus in the jaw. Eyes: Pupils are equal, round, and reactive to light. Conjunctivae and EOM are normal. No scleral icterus. Neck: Normal range of motion and full passive range of motion without pain. Cardiovascular: Normal rate and regular rhythm.    Pulmonary/Chest: Effort normal. No accessory muscle usage. No tachypnea. No respiratory distress. She has no decreased breath sounds. She has no wheezes. Abdominal: Soft. There is no tenderness. Musculoskeletal: Normal range of motion. Right upper leg: She exhibits tenderness. RIGHT LEG:  Ambulates with a normal gait  Good symmetry  No bruising, redness or swelling  Subjective paresthesia of the right lower leg   Neurological: She is alert and oriented to person, place, and time. She is not disoriented. No cranial nerve deficit. GCS eye subscore is 4. GCS verbal subscore is 5. GCS motor subscore is 6. Skin: Skin is intact. No rash noted. Psychiatric: She has a normal mood and affect. Her speech is normal.   Nursing note and vitals reviewed. Diagnostic Study Results     Labs -   No results found for this or any previous visit (from the past 12 hour(s)). Radiologic Studies -   No orders to display     CT Results  (Last 48 hours)    None        CXR Results  (Last 48 hours)    None        Medical Decision Making   I am the first provider for this patient. I reviewed the vital signs, available nursing notes, past medical history, past surgical history, family history and social history. Vital Signs-Reviewed the patient's vital signs. Patient Vitals for the past 12 hrs:   Temp Pulse Resp BP SpO2   09/14/19 0815 97.9 °F (36.6 °C) 70 18 118/70 99 %       Pulse Oximetry Analysis - 99% on RA    Records Reviewed: Nursing Notes, Old Medical Records, Previous Radiology Studies and Previous Laboratory Studies    Provider Notes (Medical Decision Making):   DDx: Lumbar radiculopathy, peripheral paresthesia    ED Course:   Initial assessment performed. The patients presenting problems have been discussed, and they are in agreement with the care plan formulated and outlined with them. I have encouraged them to ask questions as they arise throughout their visit. Disposition:  Discharge    PLAN:  1.    Discharge Medication List as of 9/14/2019  9:17 AM      START taking these medications    Details   predniSONE (STERAPRED DS) 10 mg dose pack Per Dose Pack instructions, Normal, Disp-21 Tab, R-0      gabapentin (NEURONTIN) 100 mg capsule Take 1 Cap by mouth three (3) times daily. Max Daily Amount: 300 mg., Print, Disp-18 Cap, R-0         CONTINUE these medications which have NOT CHANGED    Details   phentermine (ADIPEX_P) 15 mg capsule Take 15 mg by mouth every morning., Historical Med           2. Follow-up Information     Follow up With Specialties Details Why Contact Info    Alessandro Baker MD Neurology Schedule an appointment as soon as possible for a visit NEUROLOGY: call to schedule follow up [address]  [phone]          Return to ED if worse     Diagnosis     Clinical Impression:   1.  Numbness and tingling of right lower extremity

## 2020-01-20 ENCOUNTER — TELEPHONE (OUTPATIENT)
Dept: INTERNAL MEDICINE CLINIC | Age: 35
End: 2020-01-20

## 2020-01-22 ENCOUNTER — OFFICE VISIT (OUTPATIENT)
Dept: INTERNAL MEDICINE CLINIC | Age: 35
End: 2020-01-22

## 2020-01-22 VITALS
SYSTOLIC BLOOD PRESSURE: 100 MMHG | HEART RATE: 102 BPM | DIASTOLIC BLOOD PRESSURE: 68 MMHG | RESPIRATION RATE: 16 BRPM | BODY MASS INDEX: 41.66 KG/M2 | OXYGEN SATURATION: 97 % | TEMPERATURE: 98.2 F | HEIGHT: 60 IN | WEIGHT: 212.2 LBS

## 2020-01-22 DIAGNOSIS — M54.50 ACUTE BILATERAL LOW BACK PAIN WITHOUT SCIATICA: ICD-10-CM

## 2020-01-22 DIAGNOSIS — F32.9 REACTIVE DEPRESSION: ICD-10-CM

## 2020-01-22 DIAGNOSIS — M79.10 MYALGIA: ICD-10-CM

## 2020-01-22 DIAGNOSIS — M25.512 ACUTE PAIN OF BOTH SHOULDERS: Primary | ICD-10-CM

## 2020-01-22 DIAGNOSIS — M25.511 ACUTE PAIN OF BOTH SHOULDERS: Primary | ICD-10-CM
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
## 2020-02-16 ENCOUNTER — HOSPITAL ENCOUNTER (OUTPATIENT)
Dept: MRI IMAGING | Age: 35
Discharge: HOME | End: 2020-02-16
Attending: ORTHOPAEDIC SURGERY
Payer: COMMERCIAL

## 2020-02-16 DIAGNOSIS — M54.2 NECK PAIN: ICD-10-CM

## 2020-02-16 DIAGNOSIS — S33.5XXD SPRAIN OF LIGAMENTS OF LUMBAR SPINE, SUBSEQUENT ENCOUNTER: ICD-10-CM

## 2020-02-16 DIAGNOSIS — S13.9XXD SPRAIN, NECK, SUBSEQUENT ENCOUNTER: ICD-10-CM

## 2020-02-16 DIAGNOSIS — M54.50 LUMBAR PAIN: ICD-10-CM

## 2020-02-16 PROCEDURE — 72141 MRI NECK SPINE W/O DYE: CPT

## 2020-02-16 PROCEDURE — 72148 MRI LUMBAR SPINE W/O DYE: CPT

## 2020-03-17 ENCOUNTER — TELEPHONE (OUTPATIENT)
Dept: RHEUMATOLOGY | Age: 35
End: 2020-03-17

## 2020-03-17 NOTE — TELEPHONE ENCOUNTER
----- Message from Moraima Diana sent at 3/17/2020 10:34 AM EDT -----  Regarding: dr Deepthi Mar telephone  Patient return call    Caller's first and last name and relationship (if not the patient): pt      Best contact number(s): [phone]      Whose call is being returned: unknown       Details to clarify the request:      Justin Hawkins [phone]

## 2020-03-19 ENCOUNTER — OFFICE VISIT (OUTPATIENT)
Dept: RHEUMATOLOGY | Age: 35
End: 2020-03-19

## 2020-03-19 VITALS
SYSTOLIC BLOOD PRESSURE: 93 MMHG | TEMPERATURE: 98.5 F | HEIGHT: 60 IN | HEART RATE: 88 BPM | BODY MASS INDEX: 40.84 KG/M2 | DIASTOLIC BLOOD PRESSURE: 65 MMHG | RESPIRATION RATE: 18 BRPM | WEIGHT: 208 LBS

## 2020-03-19 DIAGNOSIS — M79.7 FIBROMYALGIA: Primary | ICD-10-CM

## 2020-03-19 DIAGNOSIS — M76.31 ILIOTIBIAL BAND SYNDROME OF BOTH SIDES: ICD-10-CM

## 2020-03-19 DIAGNOSIS — M76.32 ILIOTIBIAL BAND SYNDROME OF BOTH SIDES: ICD-10-CM

## 2020-03-19 DIAGNOSIS — M54.2 MUSCULOSKELETAL NECK PAIN: ICD-10-CM

## 2020-03-19 RX ORDER — IBUPROFEN 600 MG/1
TABLET ORAL
COMMUNITY
End: 2020-11-25

## 2020-03-19 RX ORDER — OXYCODONE AND ACETAMINOPHEN 5; 325 MG/1; MG/1
TABLET ORAL
COMMUNITY
End: 2020-11-25
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
## 2020-03-19 NOTE — LETTER
3/19/20 Patient: Jamal Parry YOB: 1985 Date of Visit: 3/19/2020 Camille Cruz DO 
[facility] [address] [facility] [address] VIA Facsimile: [phone] Dear Camille Cruz DO, Thank you for referring Ms. Cintia Henriquez to [address] [place] [address] for evaluation. My notes for this consultation are attached. If you have questions, please do not hesitate to call me. I look forward to following your patient along with you.  
 
 
Sincerely, 
 
Cherie Stevenson MD

## 2020-03-19 NOTE — PATIENT INSTRUCTIONS
FIBROMYALGIA Fibromyalgia is a disease characterized by chronic widespread musculoskeletal pain. Fibromyalgia is caused by abnormal processing of pain signals in the central nervous system, leading to exaggerated pain responses. There are functional MRI studies that have shown neuroplasticity (re-wiring) of the pain pathways in the brain. Physical and Mental Exercise The mainstay of therapy includes non-pharmacologic therapies such as cardiovascular exercise and Cognitive Behavioral Therapy which have been shown to be of benefit ([address] [facility], Am J Med 2009). Acosta Chi in particular has proven efficacy in the treatment of fibromyalgia Shiv Pollock al. [facility] J Med 2010; [phone]). Performing at least 60 minutes per day of Ysitie 71 has been shown to improve pain without medical management. Medications After discussing with your primary care and if medications are pursued, pregabalin (Lyrica), gabapentin (Neurontin), milnacipran (Georgana Cull), and duloxetine (Cymbalta) are FDA approved medications for the treatment of fibromyalgia. Narcotic Pain Medications Are BAD Narcotics, such as oxycodone, hydrocodone, morphine, dilaudid, or codeine, have not been proven to be efficacious in the treatment of fibromyalgia. In fact, narcotic use in this patient population has been observed to exacerbate depression, and may enhance the hyperalgesia which is characteristic of this condition (Jeaneen Mullet Rheum 2006). They also are at increased risk for opioid-induced hyperalgesia due predominantly to central sensitization Aden Davila al. [facility] Clin Rheumatol. 2013 Mar;19(2):72-7). Specifically, a double-blind placebo-controlled trial by Roro Jimenez al published in 1995 demonstrated that intravenous morphine did not reduce pain in fibromyalgia patients.  A study by Maame Sanchez al published in 2003 showed that fibromyalgia patients taking oral opiates did not experience improvement in their pain at four years of follow up, and also reported increased depression over the last two years of the study. There is subsequent concern that the prolonged use of narcotics to treat fibromyalgia may cause harm to these patients Fatimah Cho, Pain 2005). Finally, opioid use in fibromyalgia had poorer symptoms and functional and occupational status compared to nonusers (Lisa MEYER et al. Pain Res Treat. 2331;9897:837052). Therefore, I recommend that narcotics be avoided in all patients with fibromyalgia. My Recommendations I recommend Acosta Chi stretching exercises for at least 30 minutes per day. The [employer] has made a videotape of Acosta Chi that you can borrow from [employer], purchase online or watch for free on [employer]. com Acosta Chi for Arthritis. I strongly recommend you to join a gym that has an indoor pool, to do aqua therapy and Acosta Chi classes. Resources include: [employer], [employer], [employer], and the [facility]. We discussed treating secondary causes, such as sleep apnea, poor sleep quality, depression, anxiety weight loss, vitamin deficiencies, such as vitamin D, and pursuing aquatherapy.  I encourage you to do Ysitie 71.

## 2020-04-16 ENCOUNTER — VIRTUAL VISIT (OUTPATIENT)
Dept: INTERNAL MEDICINE CLINIC | Age: 35
End: 2020-04-16

## 2020-04-16 DIAGNOSIS — E66.01 MORBID OBESITY WITH BODY MASS INDEX (BMI) OF 40.0 TO 49.9 (HCC): ICD-10-CM

## 2020-04-16 DIAGNOSIS — R73.03 PREDIABETES: ICD-10-CM

## 2020-04-16 DIAGNOSIS — M79.7 FIBROMYALGIA: Primary | ICD-10-CM

## 2020-04-16 RX ORDER — PHENTERMINE HYDROCHLORIDE 37.5 MG/1
TABLET ORAL
COMMUNITY
Start: 2020-04-03 | End: 2021-11-29

## 2020-04-16 RX ORDER — NALTREXONE HYDROCHLORIDE 50 MG/1
25 TABLET, FILM COATED ORAL DAILY
COMMUNITY
Start: 2020-04-03 | End: 2021-11-29

## 2020-04-16 NOTE — PATIENT INSTRUCTIONS
Office Policies Phone calls/patient messages: Please allow up to 24 hours for someone in the office to contact you about your call or message. Be mindful your provider may be out of the office or your message may require further review. We encourage you to use [employer] for your messages as this is a faster, more efficient way to communicate with our office Medication Refills: 
         
Prescription medications require 48-72 business hours to process. We encourage you to use [employer] for your refills. For controlled medications: Please allow 72 business hours to process. Certain medications may require you to  a written prescription at our office. NO narcotic/controlled medications will be prescribed after 4pm Monday through Friday or on weekends Form/Paperwork Completion: 
         
Please note a $25 fee may incur for all paperwork for completed by our providers. We ask that you allow 7-10 business days. Pre-payment is due prior to picking up/faxing the completed form. You may also download your forms to [employer] to have your doctor print off. This is an established visit conducted via telemedicine. The patient has been instructed that this meets HIPAA criteria and acknowledges and agrees to this method of visitation. Juan Diego Lutz LPN 
89/53/66 
3:82 PM 
Pregabalin (By mouth) Pregabalin (pre-GA-ba-aristeo) Treats nerve and muscle pain, including fibromyalgia. Also treats seizures. Brand Name(s): Lyrica There may be other brand names for this medicine. When This Medicine Should Not Be Used: This medicine is not right for everyone. Do not use it if you had an allergic reaction to pregabalin. How to Use This Medicine:  
Capsule, Liquid · Take your medicine as directed. Your dose may need to be changed several times to find what works best for you. · Measure the oral liquid medicine with a marked measuring spoon, oral syringe, or medicine cup. · This medicine should come with a Medication Guide. Ask your pharmacist for a copy if you do not have one. · Missed dose: Take a dose as soon as you remember. If it is almost time for your next dose, wait until then and take a regular dose. Do not take extra medicine to make up for a missed dose. · Store the medicine in a closed container at room temperature, away from heat, moisture, and direct light. Drugs and Foods to Avoid: Ask your doctor or pharmacist before using any other medicine, including over-the-counter medicines, vitamins, and herbal products. · Some medicines can affect how pregabalin works. Tell your doctor if you are using any of the following: ¨ Diabetes medicine that you take by mouth (pioglitazone, rosiglitazone) ¨ An ACE inhibitor (benazepril, enalapril, lisinopril, quinapril, ramipril) · Tell your doctor if you use anything else that makes you sleepy. Some examples are allergy medicine, narcotic pain medicine, and alcohol. Warnings While Using This Medicine: · Tell your doctor if you are pregnant or breastfeeding, or if you have kidney disease, heart failure, heart rhythm problems, angioedema, a bleeding disorder, or a low blood platelet count. Tell your doctor if you have a history of alcohol or drug abuse, depression, or other mood problems. · This medicine may cause the following problems:  
¨ Serious allergic reactions ¨ Suicidal thoughts · This medicine may make you dizzy or drowsy. It may also cause blurry or double vision. Do not drive or do anything that could be dangerous until you know how this medicine affects you. · Do not stop using this medicine suddenly. Your doctor will need to slowly decrease your dose before you stop it completely. · Your doctor will check your progress and the effects of this medicine at regular visits. Keep all appointments. · Keep all medicine out of the reach of children. Never share your medicine with anyone. Possible Side Effects While Using This Medicine:  
Call your doctor right away if you notice any of these side effects: · Allergic reaction: Itching or hives, swelling in your face or hands, swelling or tingling in your mouth or throat, chest tightness, trouble breathing · Blistering, peeling, red skin rash · Blurry or double vision · Fever, chills, cough, sore throat, and body aches · Muscle pain, tenderness, or weakness, fever, or general ill feeling · Rapid weight gain, swelling in your hands, ankles, or feet · Severe dizziness or drowsiness · Sudden mood changes, unusual thoughts or behavior such as extreme happiness or depression · Suicidal thoughts · Swelling in your throat, head, or neck · Uneven heartbeat · Unusual bleeding, bruising, or weakness If you notice these less serious side effects, talk with your doctor: · Confusion, trouble concentrating · Constipation · Dry mouth If you notice other side effects that you think are caused by this medicine, tell your doctor. Call your doctor for medical advice about side effects. You may report side effects to FDA at 4-861-FDA-7258 © 2017 [facility] Information is for End User's use only and may not be sold, redistributed or otherwise used for commercial purposes. The above information is an  only. It is not intended as medical advice for individual conditions or treatments. Talk to your doctor, nurse or pharmacist before following any medical regimen to see if it is safe and effective for you.
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
## 2020-11-25 ENCOUNTER — APPOINTMENT (OUTPATIENT)
Dept: ULTRASOUND IMAGING | Age: 35
End: 2020-11-25
Attending: EMERGENCY MEDICINE
Payer: MEDICAID

## 2020-11-25 ENCOUNTER — HOSPITAL ENCOUNTER (EMERGENCY)
Age: 35
Discharge: HOME | End: 2020-11-25
Attending: EMERGENCY MEDICINE
Payer: MEDICAID

## 2020-11-25 VITALS
TEMPERATURE: 97.9 F | OXYGEN SATURATION: 100 % | SYSTOLIC BLOOD PRESSURE: 114 MMHG | BODY MASS INDEX: 42.46 KG/M2 | WEIGHT: 216.27 LBS | HEART RATE: 83 BPM | RESPIRATION RATE: 16 BRPM | HEIGHT: 60 IN | DIASTOLIC BLOOD PRESSURE: 70 MMHG

## 2020-11-25 DIAGNOSIS — M79.89 LEFT LEG SWELLING: ICD-10-CM

## 2020-11-25 DIAGNOSIS — M79.662 PAIN OF LEFT CALF: Primary | ICD-10-CM

## 2020-11-25 PROCEDURE — 74011250637 HC RX REV CODE- 250/637: Performed by: EMERGENCY MEDICINE

## 2020-11-25 PROCEDURE — 93971 EXTREMITY STUDY: CPT

## 2020-11-25 PROCEDURE — 99283 EMERGENCY DEPT VISIT LOW MDM: CPT

## 2020-11-25 RX ORDER — NAPROXEN 500 MG/1
500 TABLET ORAL 2 TIMES DAILY WITH MEALS
Qty: 20 TAB | Refills: 0 | Status: SHIPPED | OUTPATIENT
Start: 2020-11-25 | End: 2020-12-05

## 2020-11-25 RX ORDER — NAPROXEN 250 MG/1
500 TABLET ORAL
Status: COMPLETED | OUTPATIENT
Start: 2020-11-25 | End: 2020-11-25

## 2020-11-25 RX ADMIN — NAPROXEN 500 MG: 250 TABLET ORAL at 01:41

## 2020-11-25 NOTE — ED NOTES
Ace wrap applied to LLE. Ice pack supplied to patient to take home. Discharge instructions given to patient by MD Trevino. Verbalized understanding of instructions. Patient discharged without difficulty. Patient discharged in stable condition via ambulation accompanied by self.

## 2020-11-25 NOTE — DISCHARGE INSTRUCTIONS
Patient Education        Leg Pain: Care Instructions  Your Care Instructions  Many things can cause leg pain. Too much exercise or overuse can cause a muscle cramp (or charley horse). You can get leg cramps from not eating a balanced diet that has enough potassium, calcium, and other minerals. If you do not drink enough fluids or are taking certain medicines, you may develop leg cramps. Other causes of leg pain include injuries, blood flow problems, nerve damage, and twisted and enlarged veins (varicose veins). You can usually ease pain with self-care. Your doctor may recommend that you rest your leg and keep it elevated. Follow-up care is a key part of your treatment and safety. Be sure to make and go to all appointments, and call your doctor if you are having problems. It's also a good idea to know your test results and keep a list of the medicines you take. How can you care for yourself at home? · Take pain medicines exactly as directed. ? If the doctor gave you a prescription medicine for pain, take it as prescribed. ? If you are not taking a prescription pain medicine, ask your doctor if you can take an over-the-counter medicine. · Take any other medicines exactly as prescribed. Call your doctor if you think you are having a problem with your medicine. · Rest your leg while you have pain, and avoid standing for long periods of time. · Prop up your leg at or above the level of your heart when possible. · Make sure you are eating a balanced diet that is rich in calcium, potassium, and magnesium, especially if you are pregnant. · If directed by your doctor, put ice or a cold pack on the area for 10 to 20 minutes at a time. Put a thin cloth between the ice and your skin. · Your leg may be in a splint, a brace, or an elastic bandage, and you may have crutches to help you walk. Follow your doctor's directions about how long to wear supports and how to use the crutches. When should you call for help? Call 911 anytime you think you may need emergency care. For example, call if:    · You have sudden chest pain and shortness of breath, or you cough up blood.     · Your leg is cool or pale or changes color. Call your doctor now or seek immediate medical care if:    · You have increasing or severe pain.     · Your leg suddenly feels weak and you cannot move it.     · You have signs of a blood clot, such as:  ? Pain in your calf, back of the knee, thigh, or groin. ? Redness and swelling in your leg or groin.     · You have signs of infection, such as:  ? Increased pain, swelling, warmth, or redness. ? Red streaks leading from the sore area. ? Pus draining from a place on your leg. ? A fever.     · You cannot bear weight on your leg. Watch closely for changes in your health, and be sure to contact your doctor if:    · You do not get better as expected. Where can you learn more? Go to http://www.gray.com/  Enter D523 in the search box to learn more about \"Leg Pain: Care Instructions. \"  Current as of: June 26, 2019               Content Version: 12.6  © 7682-5879 [employer]. Care instructions adapted under license by [employer] (which disclaims liability or warranty for this information). If you have questions about a medical condition or this instruction, always ask your healthcare professional. [place] [address] any warranty or liability for your use of this information.

## 2020-11-29 NOTE — ED PROVIDER NOTES
EMERGENCY DEPARTMENT HISTORY AND PHYSICAL EXAM      Date: 2020  Patient Name: Arnold Gleason    History of Presenting Illness     Chief Complaint   Patient presents with    Leg Pain     pain to L calf x last night; sudden onset; +swelling noted; denies any injury or trauma, denies any recent long car/plane trips       History Provided By: Patient    HPI: Arnold Gleason, 28 y.o. female with PMHx significant for asthma, fibromyalgia, depression, anxiety presents to the ED with chief complaint of left leg pain and swelling that started about 11 PM.  Patient was sitting at the computer when she noticed that her left leg hurt. She denies any known injury. Patient has not done anything out of the ordinary where she could have injured her leg today. She denies any fevers, chills, shortness of breath, chest pain, recent travel, or use of oral contraceptives. She is a non-smoker. PCP: Clifton Mendez MD    No current facility-administered medications on file prior to encounter. Current Outpatient Medications on File Prior to Encounter   Medication Sig Dispense Refill    naltrexone (DEPADE) 50 mg tablet Take 25 mg by mouth daily.       phentermine (ADIPEX-P) 37.5 mg tablet TK 1 T PO Q MORING         Past History     Past Medical History:  Past Medical History:   Diagnosis Date    Asthma     as a child     delivery     Fibromyalgia     Headache     migraine    Hernia, umbilical 4881    Psychiatric disorder     depression and anxiety       Past Surgical History:  Past Surgical History:   Procedure Laterality Date    HX  SECTION  , ,     [facility] (Dr Zana Bagley), [facility] (Dr Zana Bagley)    HX HERNIA REPAIR  2018    HX HYSTERECTOMY      HX HYSTERECTOMY  03/10/2020       Family History:  Family History   Problem Relation Age of Onset    Diabetes Father     Hypertension Father     Diabetes Maternal Grandmother     Cancer Paternal Aunt         breast    Hypertension Paternal Grandmother     Diabetes Paternal Grandmother     Cancer Paternal Grandmother         lung    Cancer Paternal Grandfather         pancreas       Social History:  Social History     Tobacco Use    Smoking status: Former Smoker     Packs/day: 0.25     Years: 5.00     Pack years: 1.25    Smokeless tobacco: Never Used   Substance Use Topics    Alcohol use: Yes     Alcohol/week: 2.0 standard drinks     Types: 2 Cans of beer per week     Binge frequency: Weekly    Drug use: No       Allergies: Allergies   Allergen Reactions    Iodine Unknown (comments)     Topical only    Pcn [Penicillins] Other (comments)     Unknown her mother told her she was         Review of Systems   Review of Systems   Constitutional: Negative for chills and fever. HENT: Negative for congestion, ear pain and rhinorrhea. Eyes: Negative. Respiratory: Negative for cough, chest tightness, shortness of breath and wheezing. Cardiovascular: Positive for leg swelling. Negative for chest pain and palpitations. Gastrointestinal: Negative for abdominal pain, diarrhea, nausea and vomiting. Genitourinary: Negative for dysuria, flank pain and hematuria. Musculoskeletal: Positive for myalgias. Negative for back pain and neck pain. Skin: Negative for rash and wound. Neurological: Negative for dizziness, syncope, weakness, light-headedness, numbness and headaches. Psychiatric/Behavioral: Negative for confusion. The patient is not nervous/anxious. All other systems reviewed and are negative.         Physical Exam    General appearance - well nourished, well appearing, and in no distress    Chest - clear to auscultation  Heart - normal rate and regular rhythm  Abdomen - soft, nontender  Musculoskeletal -tender to palpation left calf with swelling and ecchymosis present   extremities - peripheral pulses normal, no pedal edema  Skin - normal coloration and turgor, no rashes  Neurological - alert, oriented x3, normal speech, no focal findings or movement disorder noted        Medical Decision Making   I am the first provider for this patient. I reviewed the vital signs, available nursing notes, past medical history, past surgical history, family history and social history. Vital Signs-Reviewed the patient's vital signs. Records Reviewed: Nursing Notes and Old Medical Records    Provider Notes (Medical Decision Making):   Differential diagnosis: Muscle strain, DVT, contusion  We will obtain ultrasound to rule out DVT. ED Course:   Initial assessment performed. The patients presenting problems have been discussed, and they are in agreement with the care plan formulated and outlined with them. I have encouraged them to ask questions as they arise throughout their visit. Progress Notes:     Ultrasound is negative for DVT. Will treat with ice pack, anti-inflammatory medication, and rest and elevation. Disposition:  Discharge home    PLAN:  1. Discharge Medication List as of 2020  4:13 AM      START taking these medications    Details   naproxen (Naprosyn) 500 mg tablet Take 1 Tab by mouth two (2) times daily (with meals) for 10 days. , Normal, Disp-20 Tab,R-0         CONTINUE these medications which have NOT CHANGED    Details   naltrexone (DEPADE) 50 mg tablet Take 25 mg by mouth daily. , Historical Med      phentermine (ADIPEX-P) 37.5 mg tablet TK 1 T PO Q MORING, Historical Med         STOP taking these medications       ibuprofen (MOTRIN) 600 mg tablet Comments:   Reason for Stopping:         oxyCODONE-acetaminophen (PERCOCET) 5-325 mg per tablet Comments:   Reason for Stoppin.   Follow-up Information     Follow up With Specialties Details Why Contact Info    [facility] Emergency Medicine  If symptoms worsen [address]  [address]   [address] [place] [address]    Radha Dozier MD Internal Medicine Schedule an appointment as soon as possible for a visit  [phone] [address]  [address] [address]  [phone]          Return to ED if worse     Diagnosis     Clinical Impression:   1. Pain of left calf    2.  Left leg swelling

## 2020-12-03 ENCOUNTER — TELEPHONE (OUTPATIENT)
Dept: INTERNAL MEDICINE CLINIC | Age: 35
End: 2020-12-03

## 2020-12-03 NOTE — TELEPHONE ENCOUNTER
----- Message from Maggie Fuentes sent at 2020  5:09 PM EST -----  Regarding: Subject:  MD Ascencio/ Telephone  Appointment not available  To: [facility]     Patient's first and last name: Syed Montenegro   : 85     MRN:   Caller's first and last name and relationship to patient (if not the patient):N/A   Best contact number: [phone]    Preferred date and time: Any time or date in AM prior to scheduled date. Scheduled appointment date and time: 21 @ 01:45 PM.     Reason for appointment:   Headaches, all over body aches/pain, not clear thought process   Details to clarify the request: Pt recently diagnosed with fibromyalgia, needing f/up care for treatment and is requesting earlier date than scheduled.      Copy/paste Governor Re
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
## 2020-12-14 ENCOUNTER — VIRTUAL VISIT (OUTPATIENT)
Dept: INTERNAL MEDICINE CLINIC | Age: 35
End: 2020-12-14
Payer: MEDICAID

## 2020-12-14 DIAGNOSIS — E66.9 OBESITY (BMI 30.0-34.9): ICD-10-CM

## 2020-12-14 DIAGNOSIS — E55.9 VITAMIN D DEFICIENCY: ICD-10-CM

## 2020-12-14 DIAGNOSIS — M79.7 FIBROMYALGIA: Primary | ICD-10-CM

## 2020-12-14 DIAGNOSIS — Z00.00 PHYSICAL EXAM: ICD-10-CM

## 2020-12-14 PROCEDURE — 99213 OFFICE O/P EST LOW 20 MIN: CPT | Performed by: INTERNAL MEDICINE

## 2020-12-14 RX ORDER — DULOXETIN HYDROCHLORIDE 30 MG/1
30 CAPSULE, DELAYED RELEASE ORAL DAILY
Qty: 30 CAP | Refills: 1 | Status: SHIPPED | OUTPATIENT
Start: 2020-12-14 | End: 2021-11-29

## 2021-01-03 ENCOUNTER — TELEPHONE (OUTPATIENT)
Dept: INTERNAL MEDICINE CLINIC | Age: 36
End: 2021-01-03

## 2021-01-04 NOTE — TELEPHONE ENCOUNTER
----- Message from Luis Guzman sent at 12/31/2020  3:39 PM EST -----  Regarding: Dr. Aliyah Mota  Caller's first and last name: self  Reason for call: Would like to speak to a nurse or doctor about feelings of confusion and not be cognitive. Callback required yes/no and why: Yes, to discuss symptoms and possible help.   Best contact number(s): [phone]  Details to clarify the request: NA    Message copied/pasted from [facility]

## 2021-01-05 ENCOUNTER — VIRTUAL VISIT (OUTPATIENT)
Dept: INTERNAL MEDICINE CLINIC | Age: 36
End: 2021-01-05
Payer: MEDICAID

## 2021-01-05 DIAGNOSIS — M79.7 FIBROMYALGIA: Primary | ICD-10-CM

## 2021-01-05 DIAGNOSIS — R41.0 CONFUSION: ICD-10-CM

## 2021-01-05 PROCEDURE — 99213 OFFICE O/P EST LOW 20 MIN: CPT | Performed by: INTERNAL MEDICINE

## 2021-01-05 NOTE — TELEPHONE ENCOUNTER
Called out and spoke to pt. Two pt identifiers confirmed. Pt offered/accepted Virtual appt for 1/5/21 at 1315. Pt verbalized understanding of information discussed w/ no further questions at this time.
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
## 2021-01-14 ENCOUNTER — HOSPITAL ENCOUNTER (OUTPATIENT)
Dept: PHYSICAL THERAPY | Age: 36
End: 2021-01-14
Payer: MEDICAID

## 2021-01-19 ENCOUNTER — TELEPHONE (OUTPATIENT)
Dept: INTERNAL MEDICINE CLINIC | Age: 36
End: 2021-01-19

## 2021-01-19 NOTE — TELEPHONE ENCOUNTER
Patient states she needs a call back to discuss Documentation/ Form needed for her Employer to be completed by Dr. Amaris Tapia as she was seen/appt with her for condition. Please call.  Thank you      Patient is scheduled with Dr. Douglas Viveros in April for CPE

## 2021-01-20 ENCOUNTER — APPOINTMENT (OUTPATIENT)
Dept: PHYSICAL THERAPY | Age: 36
End: 2021-01-20
Payer: MEDICAID

## 2021-01-20 NOTE — TELEPHONE ENCOUNTER
Called, left vm for pt to return call to office. **Will need to bring/send forms to office for Dr. Melissa Martinez to review.

## 2021-01-20 NOTE — TELEPHONE ENCOUNTER
Patient called to advise that form/documentation was just sent thru My Chart. Patient requests for Dr. Walter Mckenzie to address [address]. Please call to advise turn around time. Please see previous encounters.  Thank you

## 2021-01-20 NOTE — TELEPHONE ENCOUNTER
Patient states she's returning your call. Patient was advised that she can send forms Via My Chart per nurse. Patient was also advised that Nurse will advise if Legible & of Turn around time when received also Via My Chart as Dr. Cruz Hayes is currently working thru Virtual Visits & not in office. Patient was also given Nurse Pod Direct Fax number as requested per patient as alternative way to fax Documents. Please call if any further information or advise to patient needed.  Thank you

## 2021-01-21 ENCOUNTER — HOSPITAL ENCOUNTER (OUTPATIENT)
Dept: PHYSICAL THERAPY | Age: 36
Discharge: HOME | End: 2021-01-21
Payer: MEDICAID

## 2021-01-21 PROCEDURE — 97161 PT EVAL LOW COMPLEX 20 MIN: CPT

## 2021-01-21 NOTE — PROGRESS NOTES
[facility] Physical Therapy  [address] ([address] Lalitha Lord  Phone: [phone] Fax: [phone]    Plan of Care/Statement of Necessity for Physical Therapy Services  2-15    Patient name: Pino Horner  : 1985  Provider#: [id number]  Referral source: Mary Kay Mooney MD      Medical/Treatment Diagnosis: Fibromyalgia [M79.7]     Prior Hospitalization: see medical history     Comorbidities: Fibromyalgia, history of fall leading to L LE weakness  Prior Level of Function: Independent, active  Medications: Verified on Patient Summary List    Start of Care: 21      Onset Date: Chronic       The Plan of Care and following information is based on the information from the initial evaluation. Assessment/ key information:     The patient is a 28year old female referred to physical therapy services due to fibromyalgia and associated functional deficits. She demonstrates decreased multiplanar bilateral LE/core strength, decreased single limb postural control, decreased muscular endurance with sit <-> stand and walking tasks, and decreased subjective report of function (FOTO = 48). Initiated conversation regarding pain neuroscience education with regards to etiology of fibromyalgia and role of nervous system in heightening normal pain responses to environmental stressors. The patient would benefit from continued skilled physical therapy services to increase strength, endurance, and functional tolerance toward improved quality of life.     Evaluation Complexity History MEDIUM  Complexity : 1-2 comorbidities / personal factors will impact the outcome/ POC ; Examination HIGH Complexity : 4+ Standardized tests and measures addressing body structure, function, activity limitation and / or participation in recreation  ;Presentation LOW Complexity : Stable, uncomplicated  ;Clinical Decision Making MEDIUM Complexity : FOTO score of 26-74  Overall Complexity Rating: LOW     Problem List: pain affecting function, decrease ROM, decrease strength, impaired gait/ balance, decrease ADL/ functional abilitiies, decrease activity tolerance, decrease flexibility/ joint mobility and decrease transfer abilities   Treatment Plan may include any combination of the following: Therapeutic exercise, Therapeutic activities, Neuromuscular re-education, Physical agent/modality, Gait/balance training, Manual therapy, Patient education, Self Care training, Functional mobility training, Home safety training and Stair training  Patient / Family readiness to learn indicated by: asking questions, trying to perform skills and interest  Persons(s) to be included in education: patient (P)  Barriers to Learning/Limitations: None  Patient Goal (s): Return to working out like I used to  Patient Self Reported Health Status: good  Rehabilitation Potential: good    Short Term Goals: To be accomplished in 3-6 treatments: The patient will score >= 2/5 on Sahrmann Lower Abdominal Test toward improved endurance with functional activity. The patient will perform TUG in <= 11 seconds on average over two trials to demonstrate improved community ambulation potential.  Long Term Goals: To be accomplished in 8-12 treatments: The patient will demonstrate gross multiplanar bilateral LE strength increased by >= 1/2 MMT grade toward improved functional endurance with ambulating and stair navigation. The patient will score >= 70 on FOTO to demonstrate significantly improved quality of life. Frequency / Duration: Patient to be seen 2 times per week for 8-12 treatments.     Patient/ Caregiver education and instruction: self care, activity modification and exercises    [x]  Plan of care has been reviewed with MELODY Merritt PT, DPT 1/21/2021     ________________________________________________________________________    I certify that the above Therapy Services are being furnished while the patient is under my care. I agree with the treatment plan and certify that this therapy is necessary.     [facility] Signature:____________________  Date:____________Time: _________
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
## 2021-01-25 LAB
25(OH)D3+25(OH)D2 SERPL-MCNC: 10.4 NG/ML (ref 30–100)
ALBUMIN SERPL-MCNC: 4.1 G/DL (ref 3.8–4.8)
ALBUMIN/GLOB SERPL: 1.5 {RATIO} (ref 1.2–2.2)
ALP SERPL-CCNC: 72 IU/L (ref 39–117)
ALT SERPL-CCNC: 11 IU/L (ref 0–32)
AST SERPL-CCNC: 15 IU/L (ref 0–40)
BILIRUB SERPL-MCNC: 0.2 MG/DL (ref 0–1.2)
BUN SERPL-MCNC: 6 MG/DL (ref 6–20)
BUN/CREAT SERPL: 9 (ref 9–23)
CALCIUM SERPL-MCNC: 9.4 MG/DL (ref 8.7–10.2)
CCP IGA+IGG SERPL IA-ACNC: 2 UNITS (ref 0–19)
CHLORIDE SERPL-SCNC: 103 MMOL/L (ref 96–106)
CHOLEST SERPL-MCNC: 176 MG/DL (ref 100–199)
CO2 SERPL-SCNC: 26 MMOL/L (ref 20–29)
CREAT SERPL-MCNC: 0.68 MG/DL (ref 0.57–1)
ERYTHROCYTE [DISTWIDTH] IN BLOOD BY AUTOMATED COUNT: 13.5 % (ref 11.7–15.4)
EST. AVERAGE GLUCOSE BLD GHB EST-MCNC: 114 MG/DL
GLOBULIN SER CALC-MCNC: 2.8 G/DL (ref 1.5–4.5)
GLUCOSE SERPL-MCNC: 84 MG/DL (ref 65–99)
HBA1C MFR BLD: 5.6 % (ref 4.8–5.6)
HCT VFR BLD AUTO: 42.5 % (ref 34–46.6)
HDLC SERPL-MCNC: 47 MG/DL
HGB BLD-MCNC: 14.2 G/DL (ref 11.1–15.9)
LDLC SERPL CALC-MCNC: 99 MG/DL (ref 0–99)
MCH RBC QN AUTO: 28.9 PG (ref 26.6–33)
MCHC RBC AUTO-ENTMCNC: 33.4 G/DL (ref 31.5–35.7)
MCV RBC AUTO: 86 FL (ref 79–97)
PLATELET # BLD AUTO: 374 X10E3/UL (ref 150–450)
POTASSIUM SERPL-SCNC: 4.4 MMOL/L (ref 3.5–5.2)
PROT SERPL-MCNC: 6.9 G/DL (ref 6–8.5)
RBC # BLD AUTO: 4.92 X10E6/UL (ref 3.77–5.28)
RHEUMATOID FACT SERPL-ACNC: <10 IU/ML (ref 0–13.9)
SODIUM SERPL-SCNC: 139 MMOL/L (ref 134–144)
TRIGL SERPL-MCNC: 174 MG/DL (ref 0–149)
TSH SERPL DL<=0.005 MIU/L-ACNC: 1.38 UIU/ML (ref 0.45–4.5)
VLDLC SERPL CALC-MCNC: 30 MG/DL (ref 5–40)
WBC # BLD AUTO: 8.6 X10E3/UL (ref 3.4–10.8)

## 2021-01-26 RX ORDER — ERGOCALCIFEROL 1.25 MG/1
50000 CAPSULE ORAL
Qty: 16 CAP | Refills: 0 | Status: SHIPPED | OUTPATIENT
Start: 2021-01-26

## 2021-01-27 ENCOUNTER — APPOINTMENT (OUTPATIENT)
Dept: PHYSICAL THERAPY | Age: 36
End: 2021-01-27
Payer: MEDICAID

## 2021-01-28 ENCOUNTER — HOSPITAL ENCOUNTER (OUTPATIENT)
Dept: PHYSICAL THERAPY | Age: 36
Discharge: HOME | End: 2021-01-28
Payer: MEDICAID

## 2021-01-28 PROCEDURE — 97110 THERAPEUTIC EXERCISES: CPT

## 2021-01-28 NOTE — PROGRESS NOTES
PT DAILY TREATMENT NOTE 2-15    Patient Name: Ilya Chaudhari  Date:2021  : 1985  [x]  Patient  Verified  Payor: [facility] / Plan: [address] [facility] / Product Type: Managed Care Medicaid /    In time: 279  Out time:   Total Treatment Time (min): 50  Visit #: 2    Treatment Area: Fibromyalgia [M79.7]    SUBJECTIVE  Pain Level (0-10 scale): 5 \"burning\" in bilateral knees  Any medication changes, allergies to medications, adverse drug reactions, diagnosis change, or new procedure performed?: [x] No    [] Yes (see summary sheet for update)  Subjective functional status/changes:   [] No changes reported    The patient reports she was very fatigued after last visit. She notes recent worsening in L leg \"numbness/discomfort\" with associated balance issues, and her L leg gives out from time to time. OBJECTIVE    Reflexes: Patellar 1+ bilaterally, Achilles 0 bilaterally     Slump: overtly (+) with distal sensitizer (ankle) on L, MSK response on R  SLR: overtly (+) with distal sensitizer (ankle) on L    50 min Therapeutic Exercise:  [x] See flow sheet : includes time spent on continued assessment and testing   Rationale: increase ROM, increase strength, improve coordination, improve balance and increase proprioception to improve the patients ability to stand, ambulate and perform functional tasks    With   [x] TE   [] TA   [] Neuro   [] SC   [] other: Patient Education: [x] Review HEP    [] Progressed/Changed HEP based on:   [] positioning   [] body mechanics   [] transfers   [] heat/ice application    [] other:      Other Objective/Functional Measures: Patient requiring increased time to complete transfers at this date     Pain Level (0-10 scale) post treatment: Patient reporting significant increase in fatigue/soreness/\"discomfort\" at end of session today    ASSESSMENT/Changes in Function:   The patient tolerated therapy visit moderately well at this date.  Visit pace and benefit limited by patient multitasking with phone conference throughout session today. She demonstrates (+) L slump/SLR tests, indicating neurodynamic components to L LE discomfort. Emphasis on gluteal/multiplanar hip strengthening throughout session to improve stability with functional tasks. Patient very challenged to maintain positioning without compensation during standing exercise at end of session, and requiring several rest breaks throughout. Continue to gently progress as tolerated. Patient will continue to benefit from skilled PT services to modify and progress therapeutic interventions, address functional mobility deficits, address ROM deficits, address strength deficits, analyze and address soft tissue restrictions, analyze and cue movement patterns, analyze and modify body mechanics/ergonomics, assess and modify postural abnormalities and instruct in home and community integration to attain remaining goals. []  See Plan of Care  []  See progress note/recertification  []  See Discharge Summary         Progress towards goals / Updated goals:    Short Term Goals: To be accomplished in 3-6 treatments: The patient will score >= 2/5 on Sahrmann Lower Abdominal Test toward improved endurance with functional activity. - Progressing              The patient will perform TUG in <= 11 seconds on average over two trials to demonstrate improved community ambulation potential. - Progressing  Long Term Goals: To be accomplished in 8-12 treatments: The patient will demonstrate gross multiplanar bilateral LE strength increased by >= 1/2 MMT grade toward improved functional endurance with ambulating and stair navigation. - Progressing              The patient will score >= 70 on FOTO to demonstrate significantly improved quality of life. Frequency / Duration: Patient to be seen 2 times per week for 8-12 treatments.  - Progressing    PLAN  [x]  Upgrade activities as tolerated     [x]  Continue plan of care  [x]  Update interventions per flow sheet       []  Discharge due to:_  []  Other:_      Kathya Sanchez, PT, DPT 1/28/2021

## 2021-02-02 ENCOUNTER — HOSPITAL ENCOUNTER (OUTPATIENT)
Dept: PHYSICAL THERAPY | Age: 36
Discharge: HOME | End: 2021-02-02
Payer: MEDICAID

## 2021-02-02 PROCEDURE — 97110 THERAPEUTIC EXERCISES: CPT

## 2021-02-02 NOTE — PROGRESS NOTES
PT DAILY TREATMENT NOTE 2-15    Patient Name: Mirta Mccoy  Date:2021  : 1985  [x]  Patient  Verified  Payor: [address] / Plan: [address] [facility] / Product Type: Managed Care Medicaid /    In time:434  Out time:440  Total Treatment Time (min): 77  Visit #:  3    Treatment Area: Fibromyalgia [M79.7]    SUBJECTIVE  Pain Level (0-10 scale): 8/10  Any medication changes, allergies to medications, adverse drug reactions, diagnosis change, or new procedure performed?: [x] No    [] Yes (see summary sheet for update)  Subjective functional status/changes:   [] No changes reported  Patient reports she has been walking one mile three times a day, with the early walk being the most challenging. OBJECTIVE    66 min Therapeutic Exercise:  [x] See flow sheet :   Rationale: increase ROM and increase strength to improve the patients ability to perform ADLs and reduce pain levels    With   [] TE   [] TA   [] Neuro   [] SC   [] other: Patient Education: [x] Review HEP    [] Progressed/Changed HEP based on:   [] positioning   [] body mechanics   [] transfers   [] heat/ice application    [] other:      Other Objective/Functional Measures: none noted     Pain Level (0-10 scale) post treatment: 6/10    ASSESSMENT/Changes in Function:   Patient showed greater instability and pain when utilizing the LLE. Adjusted bridges to reduce pain applied to the lower back. Tolerated all therex, will continue to progress as toelrated. Patient will continue to benefit from skilled PT services to modify and progress therapeutic interventions, address functional mobility deficits, address ROM deficits, address strength deficits, analyze and address soft tissue restrictions, analyze and cue movement patterns, analyze and modify body mechanics/ergonomics and assess and modify postural abnormalities to attain remaining goals.      [x]  See Plan of Care  []  See progress note/recertification  []  See Discharge Summary Progress towards goals / Updated goals:  Patient is progressing towards goals.      PLAN  [x]  Upgrade activities as tolerated     [x]  Continue plan of care  [x]  Update interventions per flow sheet       []  Discharge due to:_  []  Other:_      [facility], PTA 2/2/2021

## 2021-02-04 ENCOUNTER — TELEPHONE (OUTPATIENT)
Dept: INTERNAL MEDICINE CLINIC | Age: 36
End: 2021-02-04

## 2021-02-04 ENCOUNTER — HOSPITAL ENCOUNTER (OUTPATIENT)
Dept: PHYSICAL THERAPY | Age: 36
End: 2021-02-04
Payer: MEDICAID

## 2021-02-04 NOTE — TELEPHONE ENCOUNTER
Pt calling to inquire about status of [employer]  Form completion. Pt needs the forms by feb 7. She was unable to understand or find completed documents in [place]. Please call back to advise.

## 2021-02-05 NOTE — TELEPHONE ENCOUNTER
Called out and spoke to pt. Two pt identifiers confirmed. Pt informed that per Dr. Mic Ovalle, it is unknown as to what/why pt is filling for. Pt asking for Employee Accommodation:  Shorten work week to 4 days/week from 5 days/week. Also 5 excused days/month for fibromyalgia flare-ups (Not 5 consecutive days per flare up). Pt informed Dr. Mic Ovalle will be notified for further review. Pt verbalized understanding of information discussed w/ no further questions at this time.       Dx Fibromyalgia: 3/19/2020

## 2021-02-05 NOTE — TELEPHONE ENCOUNTER
Fernando Nyhan, MD Alaine Ferguson, LPN             Reviewed her records     Can do FMLA for dr visits only. That is 1 day per month. Over the next 1 year. As for the accomodations she is requesting--would need to see rheumatology and discuss need further.  I cant do what she requests below. Previous Messages    ----- Message -----   From: Fernando Nyhan, MD   Sent: 2/5/2021   2:04 PM EST   To: Jose Gutierrez MD     she seeking employee accommodation 2 part:   1: asking reduced work schedule to 4 days per week d/t fibromyalgia:   2: more fmla based---asking 5 days per month for fibro flare-ups (not consecutive days).

## 2021-02-05 NOTE — TELEPHONE ENCOUNTER
Called out and spoke to pt. Two pt identifiers confirmed. Pt informed that Dr. Walter Mckenzie reviewed her records. Pt informed per Dr. Walter Mckenzie can only do FMLA for Dr visits only--1 day/month over the next 1 year. Pt informed per Dr. Walter Mckenzie as for the accomodations she is requesting, pt would need to see rheumatology. Pt asking if she can get the accommodation to cover for PT visits. Pt accidentally merged the call into another and requests to call the office back once done w/ her other call. Awaiting callback.

## 2021-02-08 NOTE — TELEPHONE ENCOUNTER
----- Message from Elvis Jo sent at 2/8/2021  4:09 PM EST -----  Regarding: Dr. Charis Jimenez telephone  Patient return call    Caller's first and last name and relationship (if not the patient): self      Best contact number(s): [phone]      Whose call is being returned: nurse (veronique/ulysses)      Details to clarify the request: pt wants to make sure you all on the same page. Pt wouldn't give me the info, but stated you all know what's going on. Please call her around 8am in the morning or Wed/Thur anytime, pt has those days off.       Message from [facility]

## 2021-02-09 ENCOUNTER — HOSPITAL ENCOUNTER (OUTPATIENT)
Dept: PHYSICAL THERAPY | Age: 36
Discharge: HOME | End: 2021-02-09
Payer: MEDICAID

## 2021-02-09 PROCEDURE — 97110 THERAPEUTIC EXERCISES: CPT

## 2021-02-09 NOTE — PROGRESS NOTES
PT DAILY TREATMENT NOTE 2-15    Patient Name: Mary Grace Gunter  Date:2021  : 1985  [x]  Patient  Verified  Payor: Nikole Pablo / Plan: Julissa Singleton / Product Type: Managed Care Medicaid /    In time:433  Out time:532  Total Treatment Time (min): 61  Visit #:  4    Treatment Area: Fibromyalgia [M79.7]    SUBJECTIVE  Pain Level (0-10 scale): 3/10  Any medication changes, allergies to medications, adverse drug reactions, diagnosis change, or new procedure performed?: [x] No    [] Yes (see summary sheet for update)  Subjective functional status/changes:   [] No changes reported  Patient reports she has been incorporating an exercise program with her HEP, which she feels like shes getting good muscle activation with no pain. OBJECTIVE     59 min Therapeutic Exercise:  [x] See flow sheet :   Rationale: increase ROM and increase strength to improve the patients ability to perform ADLs and reduce pain levels          With   [] TE   [] TA   [] Neuro   [] SC   [] other: Patient Education: [x] Review HEP    [] Progressed/Changed HEP based on:   [] positioning   [] body mechanics   [] transfers   [] heat/ice application    [] other:      Other Objective/Functional Measures: none noted     Pain Level (0-10 scale) post treatment: 3/10    ASSESSMENT/Changes in Function:    Patient tolerated all therex well, will continue to progress as tolerated. Patient will continue to benefit from skilled PT services to modify and progress therapeutic interventions, address functional mobility deficits, address ROM deficits, address strength deficits, analyze and address soft tissue restrictions, analyze and cue movement patterns, analyze and modify body mechanics/ergonomics and assess and modify postural abnormalities to attain remaining goals.      [x]  See Plan of Care  []  See progress note/recertification  []  See Discharge Summary         Progress towards goals / Updated goals:  Patient is progressing towards goals.      PLAN  [x]  Upgrade activities as tolerated     [x]  Continue plan of care  [x]  Update interventions per flow sheet       []  Discharge due to:_  []  Other:_      Dom Cortés, PTA 2/9/2021

## 2021-02-10 NOTE — TELEPHONE ENCOUNTER
Called out and spoke to pt. Two pt identifiers confirmed. Pt informed per Dr. Yuriy Zamudio that Dr. Yuriy Zamudio can fill out forms for [facility] for appt's and not the accommodation regarding lessening her work week. Pt states per her employer, that she has to be employed there for at least a year and she has only been there for 5/6 months. Pt was advised to have the Accommodation form done. Pt states that her employer was the one who suggested pt ask for 5 days/month off which \"was excessive\". Pt states being fine with not having all that time off each month. Pt asking if she would be able to have extra breaks or longer breaks \"to get up and stretch my legs\". Pt informed Dr. Yuriy Zamudio will be notified. Pt verbalized understanding of information discussed w/ no further questions at this time.

## 2021-02-11 ENCOUNTER — APPOINTMENT (OUTPATIENT)
Dept: PHYSICAL THERAPY | Age: 36
End: 2021-02-11
Payer: MEDICAID

## 2021-02-12 ENCOUNTER — DOCUMENTATION ONLY (OUTPATIENT)
Dept: INTERNAL MEDICINE CLINIC | Age: 36
End: 2021-02-12

## 2021-02-12 NOTE — TELEPHONE ENCOUNTER
Called out and spoke to pt. Two pt identifiers confirmed. Pt informed per Dr. Monika Salmeron that Dr. Monika Salmeron filled out forms to cover for appt's but did not add any accommodations. Informed pt that forms were faxed this morning. Pt verbalized understanding of information discussed w/ no further questions at this time.

## 2021-02-12 NOTE — PROGRESS NOTES
[employer] Provider Packet faxed to [facility] at [phone] w/ confirmation received. Placed into in-office scanning.

## 2021-02-16 ENCOUNTER — HOSPITAL ENCOUNTER (OUTPATIENT)
Dept: PHYSICAL THERAPY | Age: 36
Discharge: HOME | End: 2021-02-16
Payer: MEDICAID

## 2021-02-16 PROCEDURE — 97110 THERAPEUTIC EXERCISES: CPT

## 2021-02-16 NOTE — PROGRESS NOTES
PT DAILY TREATMENT NOTE 2-15    Patient Name: Trung Kelley  Date:2021  : 1985  [x]  Patient  Verified  Payor: [address] / Plan: [address] [facility] / Product Type: Managed Care Medicaid /    In time:441  Out time:535  Total Treatment Time (min): 47  Visit #:  5    Treatment Area: Fibromyalgia [M79.7]    SUBJECTIVE  Pain Level (0-10 scale): 5/10  Any medication changes, allergies to medications, adverse drug reactions, diagnosis change, or new procedure performed?: [x] No    [] Yes (see summary sheet for update)  Subjective functional status/changes:   [] No changes reported  Patient reports she was much achier this weekend than normal.     OBJECTIVE    54 min Therapeutic Exercise:  [x] See flow sheet :   Rationale: increase ROM and increase strength to improve the patients ability to perform ADLs and reduce pain levels          With   [] TE   [] TA   [] Neuro   [] SC   [] other: Patient Education: [x] Review HEP    [] Progressed/Changed HEP based on:   [] positioning   [] body mechanics   [] transfers   [] heat/ice application    [] other:      Other Objective/Functional Measures: none noted     Pain Level (0-10 scale) post treatment: 4/10    ASSESSMENT/Changes in Function:   Patient tolerated all therex, will continue to progress as tolerated. Patient will continue to benefit from skilled PT services to modify and progress therapeutic interventions, address functional mobility deficits, address ROM deficits, address strength deficits, analyze and address soft tissue restrictions, analyze and cue movement patterns, analyze and modify body mechanics/ergonomics and assess and modify postural abnormalities to attain remaining goals. [x]  See Plan of Care  []  See progress note/recertification  []  See Discharge Summary         Progress towards goals / Updated goals:  Patient is progressing towards goals.       PLAN  [x]  Upgrade activities as tolerated     [x]  Continue plan of care  [x]  Update interventions per flow sheet       []  Discharge due to:_  []  Other:_      Kay Gaspar, PTA 2/16/2021

## 2021-02-18 ENCOUNTER — APPOINTMENT (OUTPATIENT)
Dept: PHYSICAL THERAPY | Age: 36
End: 2021-02-18
Payer: MEDICAID

## 2021-02-23 ENCOUNTER — HOSPITAL ENCOUNTER (OUTPATIENT)
Dept: PHYSICAL THERAPY | Age: 36
Discharge: HOME | End: 2021-02-23
Payer: MEDICAID

## 2021-02-23 PROCEDURE — 97110 THERAPEUTIC EXERCISES: CPT

## 2021-02-23 NOTE — PROGRESS NOTES
PT DAILY TREATMENT NOTE 2-15    Patient Name: Pedro Curiel  Date:2021  : 1985  [x]  Patient  Verified  Payor: [address] / Plan: [address] [facility] / Product Type: Managed Care Medicaid /    In time:430  Out time:521  Total Treatment Time (min): 51  Visit #:  6    Treatment Area: Fibromyalgia [M79.7]    SUBJECTIVE  Pain Level (0-10 scale): 5/10  Any medication changes, allergies to medications, adverse drug reactions, diagnosis change, or new procedure performed?: [x] No    [] Yes (see summary sheet for update)  Subjective functional status/changes:   [] No changes reported  Patient reports she has been more active since the begning. Pain has remained around the same. OBJECTIVE    51 min Therapeutic Exercise:  [x] See flow sheet :   Rationale: increase ROM and increase strength to improve the patients ability to perform ADLs and reduce pain levels    With   [] TE   [] TA   [] Neuro   [] SC   [] other: Patient Education: [x] Review HEP    [] Progressed/Changed HEP based on:   [] positioning   [] body mechanics   [] transfers   [] heat/ice application    [] other:      Other Objective/Functional Measures: FOTO:  56  TU trial: 14, 2 trial: 11     Pain Level (0-10 scale) post treatment: 5/10    ASSESSMENT/Changes in Function:   Will plan on continuing therapy twice a week for the next two weeks. Patient will continue to benefit from skilled PT services to modify and progress therapeutic interventions, address functional mobility deficits, address ROM deficits, address strength deficits, analyze and address soft tissue restrictions, analyze and cue movement patterns, analyze and modify body mechanics/ergonomics and assess and modify postural abnormalities to attain remaining goals. []  See Plan of Care  [x]  See progress note/recertification  []  See Discharge Summary         Progress towards goals / Updated goals:     Short Term Goals:  To be accomplished in 3-6 treatments: The patient will score >= 2/5 on Sahrmann Lower Abdominal Test toward improved endurance with functional activity. Progressing towards               The patient will perform TUG in <= 11 seconds on average over two trials to demonstrate improved community ambulation potential. Progressing towards  Long Term Goals: To be accomplished in 8-12 treatments: The patient will demonstrate gross multiplanar bilateral LE strength increased by >= 1/2 MMT grade toward improved functional endurance with ambulating and stair navigation. Progressing towards              The patient will score >= 70 on FOTO to demonstrate significantly improved quality of life.  Progressing towards     PLAN  [x]  Upgrade activities as tolerated     [x]  Continue plan of care  [x]  Update interventions per flow sheet       []  Discharge due to:_  []  Other:_      Revonda Dancer, MELODY 2/23/2021

## 2021-02-24 NOTE — PROGRESS NOTES
[facility] Physical Therapy  [address] ([address]  [place] [address]  Phone: [phone] Fax: [phone]    Progress Note    Name: Reggie Burgos   : 1985   MD: Krysten Ascencio MD       Treatment Diagnosis: Fibromyalgia [M79.7]  Start of Care: 21    Visits from Start of Care: 6  Missed Visits: 7    Summary of Care: Therapy has included therapeutic exercise to improve pain management strategies, neurodynamic impairments, increase core/hip/LE strength, postural control, and functional endurance/independence due to functional deficits related to fibromyalgia.    Assessment / Recommendations: The patient is a 35 year old female who has participated in 6 skilled physical therapy visits due to impairments and functional deficits related to fibromyalgia. She demonstrates mildly improved community ambulation score (Timed Up and Go = 12.5 seconds today versus 12.9 seconds at initial evaluation). She scored 56 on FOTO today, demonstrating 8-point improvement in subjective report of function since initial evaluation. She reports she has been more active since initiating physical therapy plan of care. She has met 0/4 physical therapy goals, however demonstrates slow, steady progress toward all. She would benefit from continued skilled physical therapy services 1-2 times per week for 4-6 treatments to improve symptom management, functional safety and community ambulation potential, and to transition to independent HEP/fitness program toward improved functional independence.    Short Term Goals: To be accomplished in 3-6 treatments:               The patient will score >= 2/5 on Sahrmann Lower Abdominal Test toward improved endurance with functional activity. - Progressing               The patient will perform TUG in <= 11 seconds on average over two trials to demonstrate improved community ambulation potential. - Progressing  Long Term  Goals: To be accomplished in 8-12 treatments:               BGL patient will demonstrate gross multiplanar bilateral LE strength increased by >= 1/2 MMT grade toward improved functional endurance with ambulating and stair navigation. - Progressing              The patient will score >= 70 on FOTO to demonstrate significantly improved quality of life. - Progressing   Updated Frequency/Duration: 1-2x/week for 4-6 treatments.     Anamika Squires PT, DPT 2/24/2021

## 2021-02-25 ENCOUNTER — HOSPITAL ENCOUNTER (OUTPATIENT)
Dept: PHYSICAL THERAPY | Age: 36
End: 2021-02-25
Payer: MEDICAID

## 2021-03-02 ENCOUNTER — HOSPITAL ENCOUNTER (OUTPATIENT)
Dept: PHYSICAL THERAPY | Age: 36
Discharge: HOME | End: 2021-03-02
Payer: MEDICAID

## 2021-03-02 PROCEDURE — 97110 THERAPEUTIC EXERCISES: CPT

## 2021-03-02 NOTE — PROGRESS NOTES
PT DAILY TREATMENT NOTE 2-15    Patient Name: Kvng Pena  Date:3/2/2021  : 1985  [x]  Patient  Verified  Payor: [address] / Plan: [address] [facility] / Product Type: Managed Care Medicaid /    In time: 7818   Out time: 7151   Total Treatment Time (min): 53  Visit #:  7    Treatment Area: Fibromyalgia [M79.7]    SUBJECTIVE  Pain Level (0-10 scale): 5 globally  Any medication changes, allergies to medications, adverse drug reactions, diagnosis change, or new procedure performed?: [x] No    [] Yes (see summary sheet for update)  Subjective functional status/changes:   [] No changes reported    The patient reports she has new onset of seated L knee pain \"like something is crawling\" along the front of her knee, associated with when it snows. She notes continued \"brain fog\" and L LE numbness/tingling limiting function. She continues to work on [employer] daily since last visit, mixing and matching exercises. She has been walking more daily for exercise, and has stayed busy. She plans to follow up with referring physician due to new-onset of symptoms. OBJECTIVE    53 min Therapeutic Exercise:  [x] See flow sheet :   Rationale: increase ROM and increase strength to improve the patients ability to perform ADLs and reduce pain levels    With   [x] TE   [] TA   [] Neuro   [] SC   [] other: Patient Education: [x] Review HEP    [] Progressed/Changed HEP based on:   [] positioning   [] body mechanics   [] transfers   [] heat/ice application    [] other:      Other Objective/Functional Measures: SLR = overtly (+) on L with distal sensitizer (ankle DF/PF)     Pain Level (0-10 scale) post treatment: Patient noting improvement in bilateral knee pain, increased overall fatigue at end of session today    ASSESSMENT/Changes in Function:   The patient tolerated therapy visit well at this date.  She continues to demonstrate decreased activity tolerance related to bilateral knee pain, however able to complete visit with emphasis on increasing standing exercise time and decreasing rest breaks. Patient demonstrates significant ipsilateral trunk lean during sidestepping and monster walks related to decreased bilateral hip stability and strength. Patient noting decrease in bilateral knee pain, overall increase in fatigue at end of session today. Continue to progress as tolerated. Patient will continue to benefit from skilled PT services to modify and progress therapeutic interventions, address functional mobility deficits, address ROM deficits, address strength deficits, analyze and address soft tissue restrictions, analyze and cue movement patterns, analyze and modify body mechanics/ergonomics and assess and modify postural abnormalities to attain remaining goals. [x]  See Plan of Care  []  See progress note/recertification  []  See Discharge Summary         Progress towards goals / Updated goals:     Short Term Goals: To be accomplished in 3-6 treatments:                patient will score >= 2/5 on Sahrmann Lower Abdominal Test toward improved endurance with functional activity. - Progressing               The patient will perform TUG in <= 11 seconds on average over two trials to demonstrate improved community ambulation potential. - Progressing  Long Term Goals: To be accomplished in 8-12 treatments:               ZRT patient will demonstrate gross multiplanar bilateral LE strength increased by >= 1/2 MMT grade toward improved functional endurance with ambulating and stair navigation. - Progressing              The patient will score >= 70 on FOTO to demonstrate significantly improved quality of life. - Progressing   Updated Frequency/Duration: 1-2x/week for 4-6 treatments.     PLAN  [x]  Upgrade activities as tolerated     [x]  Continue plan of care  [x]  Update interventions per flow sheet       []  Discharge due to:_  []  Other:_      Piper Godfrey, PT, DPT 3/2/2021

## 2021-03-04 ENCOUNTER — APPOINTMENT (OUTPATIENT)
Dept: PHYSICAL THERAPY | Age: 36
End: 2021-03-04
Payer: MEDICAID

## 2021-03-09 ENCOUNTER — HOSPITAL ENCOUNTER (OUTPATIENT)
Dept: PHYSICAL THERAPY | Age: 36
Discharge: HOME | End: 2021-03-09
Payer: MEDICAID

## 2021-03-09 PROCEDURE — 97110 THERAPEUTIC EXERCISES: CPT

## 2021-03-09 NOTE — PROGRESS NOTES
PT DAILY TREATMENT NOTE 2-15    Patient Name: Pedro Curiel  Date:3/9/2021  : 1985  [x]  Patient  Verified  Payor: [address] / Plan: [address] [facility] / Product Type: Managed Care Medicaid /    In time:    Out time:    Total Treatment Time (min): 60  Visit #:  8    Treatment Area: Fibromyalgia [M79.7]    SUBJECTIVE  Pain Level (0-10 scale): 3 globally  Any medication changes, allergies to medications, adverse drug reactions, diagnosis change, or new procedure performed?: [x] No    [] Yes (see summary sheet for update)  Subjective functional status/changes:   [] No changes reported    The patient reports she feels somewhat better, more energetic this week; she has been working on core strengthening at home. She has not yet tried jose-like activities. OBJECTIVE    60 min Therapeutic Exercise:  [x] See flow sheet :   Rationale: increase ROM and increase strength to improve the patients ability to perform ADLs and reduce pain levels    With   [x] TE   [] TA   [] Neuro   [] SC   [] other: Patient Education: [x] Review HEP    [] Progressed/Changed HEP based on:   [] positioning   [] body mechanics   [] transfers   [] heat/ice application    [] other:      Other Objective/Functional Measures: None noted     Pain Level (0-10 scale) post treatment: Patient noting increased exercise-induced soreness at end of session today    ASSESSMENT/Changes in Function:   The patient tolerated therapy visit well at this date. She demonstrates improved tolerance to standing exercise progression today, with continued emphasis on decreasing rest breaks. Patient demonstrates significant ipsilateral trunk lean during sidestepping and monster walks, pallof press, and extensions with march related to decreased bilateral hip stability and strength. Patient noting overall increase in exercise-induced soreness and fatigue at end of session today.  Plan to implement progressive jose moves next visit to gauge tolerance. Continue to progress as tolerated. Patient will continue to benefit from skilled PT services to modify and progress therapeutic interventions, address functional mobility deficits, address ROM deficits, address strength deficits, analyze and address soft tissue restrictions, analyze and cue movement patterns, analyze and modify body mechanics/ergonomics and assess and modify postural abnormalities to attain remaining goals. [x]  See Plan of Care  []  See progress note/recertification  []  See Discharge Summary         Progress towards goals / Updated goals:     Short Term Goals: To be accomplished in 3-6 treatments:               KVZ patient will score >= 2/5 on Sahrmann Lower Abdominal Test toward improved endurance with functional activity. - Progressing               The patient will perform TUG in <= 11 seconds on average over two trials to demonstrate improved community ambulation potential. - Progressing  Long Term Goals: To be accomplished in 8-12 treatments:               TDM patient will demonstrate gross multiplanar bilateral LE strength increased by >= 1/2 MMT grade toward improved functional endurance with ambulating and stair navigation. - Progressing              The patient will score >= 70 on FOTO to demonstrate significantly improved quality of life. - Progressing   Updated Frequency/Duration: 1-2x/week for 4-6 treatments.     PLAN  [x]  Upgrade activities as tolerated     [x]  Continue plan of care  [x]  Update interventions per flow sheet       []  Discharge due to:_  []  Other:_      Arun Rothman, PT, DPT 3/9/2021

## 2021-03-11 ENCOUNTER — HOSPITAL ENCOUNTER (OUTPATIENT)
Dept: PHYSICAL THERAPY | Age: 36
End: 2021-03-11
Payer: MEDICAID

## 2021-04-06 ENCOUNTER — HOSPITAL ENCOUNTER (OUTPATIENT)
Dept: PHYSICAL THERAPY | Age: 36
Discharge: HOME | End: 2021-04-06
Payer: MEDICAID

## 2021-04-06 PROCEDURE — 97110 THERAPEUTIC EXERCISES: CPT

## 2021-04-06 NOTE — PROGRESS NOTES
[address] Physical Therapy  [address] ([address] [place]  Phone: [phone] Fax: [phone]    Progress Note    Name: Anmol Cantrell   : 1985   MD: Lizet Nichols MD       Treatment Diagnosis: Fibromyalgia [M79.7]  Start of Care: 21    Visits from Start of Care: 9  Missed Visits: 10    Summary of Care: Therapy has included therapeutic exercise to improve pain management strategies, neurodynamic impairments, increase core/hip/LE strength, postural control, and functional endurance/independence due to functional deficits related to fibromyalgia. Assessment / Recommendations: The patient is a 28year old female who has participated in 9 skilled physical therapy visits due to impairments and functional deficits related to fibromyalgia. She demonstrates improved community ambulation score (Timed Up and Go = 10.45 seconds today versus 12.9 seconds at initial evaluation). She scored 49 on FOTO today, demonstrating minimal improvement in subjective report of function since initial evaluation, however may be influenced by recent ED visit and changes in symptoms. She reports she has been more active since initiating physical therapy plan of care, and notes improvement in LE symptoms; she continues to note R shoulder pain limiting function. She has met 2/4 physical therapy goals, and demonstrates slow, steady progress toward remaining goals. She would benefit from continued skilled physical therapy services 1-2 times per week for 1-2 treatments to improve symptom management, functional safety and community ambulation potential, and to transition to independent HEP/fitness program toward improved functional independence. Short Term Goals: To be accomplished in 3-6 treatments:               MDE patient will score >= 2/5 on Sahrmann Lower Abdominal Test toward improved endurance with functional activity.  - Met              The patient will perform TUG in <= 11 seconds on average over two trials to demonstrate improved community ambulation potential. - Met  Long Term Goals: To be accomplished in 8-12 treatments:               XPA patient will demonstrate gross multiplanar bilateral LE strength increased by >= 1/2 MMT grade toward improved functional endurance with ambulating and stair navigation. - Progressing              The patient will score >= 70 on FOTO to demonstrate significantly improved quality of life. - Progressing   Updated Frequency/Duration: 1-2x/week for 1-2 treatments.     María Zheng, PT, DPT 4/6/2021

## 2021-04-06 NOTE — PROGRESS NOTES
PT DAILY TREATMENT NOTE 2-15    Patient Name: Evon Dennis  Date:2021  : 1985  [x]  Patient  Verified  Payor: Maikol Chapman / Plan: [address] [place] [address] / Product Type: Managed Care Medicaid /    In time: 4665   Out time: 8134   Total Treatment Time (min): 39  Visit #:  9    Treatment Area: Fibromyalgia [M79.7]    SUBJECTIVE  Pain Level (0-10 scale): 2 in R shoulder  Any medication changes, allergies to medications, adverse drug reactions, diagnosis change, or new procedure performed?: [x] No    [] Yes (see summary sheet for update)  Subjective functional status/changes:   [] No changes reported    The patient reports she was hospitalized for depression a few weeks back; she notes transition in medications and reports Abilify has improved pain she relates to fibromyalgia, particularly in her legs. She notes she had a massage, which eased her R knee pain. She notes increased R shoulder and upper back pain, but has been doing better overall, and hopes to transition to independent fitness program with  that is covered by her insurance.     OBJECTIVE    Other Observations: Patient required increased time to complete transfers throughout visit today  Gait and Functional Mobility: Patient demonstrates decreased talon, bilateral Trendelenberg, increased base of support, bilateral LE ER, mild lateral trunk sway  Palpation: Tender to palpation with light pressure/touch to multiple regions throughout exam today                                                     Lumbar AROM:                                                                                               R                      L                        Flexion                                     Fingertips to distal tibia                         Extension                                100%                        Side Bending                          Fingertips to knee joint line      Fingertips 4cm from knee joint line Rotation                                   100%                 80% (notes R upper back and shoulder p!)                         LOWER QUARTER                            MUSCLE STRENGTH  KEY                                                                             R                      L  0 - No Contraction                   L1, L2 Psoas               4+                     4  1 - Trace                                  L3 Quads                    4                      4  2 - Poor                                   L4 Tib Ant                    5                      4+  3 - Fair                                     L5 EHL                        5                      4  4 - Good                                  S1 Peroneals              4+                    4  5 - Normal                               S2 Hams                     4+                      4+                                                MMT:              HIP Ext: R = 5/L = 4+              HIP Abd: R = 4+ (p!)/L = 4-              HIP Add: R = 5/L = 4+  Neurological: Reflexes / Sensations: Patient demonstrates decreased light touch sensation along L L4-S1 dermatomes  Functional Tests:  Sahrmann's Lower Abdominal Test: 2/5  Single Limb Balance: R = 25.4 seconds; L = 16.3 seconds     *Noted patient with UEs abducted, increased trunk sway, hip > ankle strategy to maintain balance  30 Second Sit To Stand: 11.5x  TUG: 10.6 seconds, 10.3 seconds. Average of two trials = 10.45 seconds. 39 min Therapeutic Exercise:  [x] See flow sheet : Includes time spent on re-assessment tests and measures, as well as discussing plan for patient's transition to independent fitness program after completion of PT plan of care. Patient plans to pursue personal/fitness training independently toward return to jose/prior level of function.    Rationale: increase ROM and increase strength to improve the patients ability to stand, ambulate and perform functional tasks    With   [x] TE   [] TA   [] Neuro   [] SC   [] other: Patient Education: [x] Review HEP    [] Progressed/Changed HEP based on:   [] positioning   [] body mechanics   [] transfers   [] heat/ice application    [] other:      Other Objective/Functional Measures: FOTO = 49     Pain Level (0-10 scale) post treatment: Patient noting increased exercise-induced soreness at end of session today    ASSESSMENT/Changes in Function:   The patient is a 28year old female who has participated in 9 skilled physical therapy visits due to impairments and functional deficits related to fibromyalgia. She demonstrates improved community ambulation score (Timed Up and Go = 10.45 seconds today versus 12.9 seconds at initial evaluation). She scored 49 on FOTO today, demonstrating minimal improvement in subjective report of function since initial evaluation, however may be influenced by recent ED visit and changes in symptoms. She reports she has been more active since initiating physical therapy plan of care, and notes improvement in LE symptoms; she continues to note R shoulder pain limiting function. She has met 2/4 physical therapy goals, and demonstrates slow, steady progress toward remaining goals. She would benefit from continued skilled physical therapy services 1-2 times per week for 1-2 treatments to improve symptom management, functional safety and community ambulation potential, and to transition to independent HEP/fitness program toward improved functional independence. The patient tolerated therapy visit well at this date. She demonstrates improved tolerance to standing exercise progression today, with continued emphasis on decreasing rest breaks. Patient demonstrates significant ipsilateral trunk lean during sidestepping and monster walks, pallof press, and extensions with march related to decreased bilateral hip stability and strength.  Patient noting overall increase in exercise-induced soreness and fatigue at end of session today. Plan to implement progressive jose moves next visit to gauge tolerance. Continue to progress as tolerated. Patient will continue to benefit from skilled PT services to modify and progress therapeutic interventions, address functional mobility deficits, address ROM deficits, address strength deficits, analyze and address soft tissue restrictions, analyze and cue movement patterns, analyze and modify body mechanics/ergonomics and assess and modify postural abnormalities to attain remaining goals. []  See Plan of Care  [x]  See progress note/recertification  []  See Discharge Summary         Progress towards goals / Updated goals:     Short Term Goals: To be accomplished in 3-6 treatments:               RVF patient will score >= 2/5 on Sahrmann Lower Abdominal Test toward improved endurance with functional activity. - Met              The patient will perform TUG in <= 11 seconds on average over two trials to demonstrate improved community ambulation potential. - Met  Long Term Goals: To be accomplished in 8-12 treatments:               DPE patient will demonstrate gross multiplanar bilateral LE strength increased by >= 1/2 MMT grade toward improved functional endurance with ambulating and stair navigation. - Progressing              The patient will score >= 70 on FOTO to demonstrate significantly improved quality of life. - Progressing   Updated Frequency/Duration: 1-2x/week for 1-2 treatments.     PLAN  [x]  Upgrade activities as tolerated     [x]  Continue plan of care  [x]  Update interventions per flow sheet       []  Discharge due to:_  []  Other:_      Irma Anne, PT, DPT 4/6/2021

## 2021-04-08 ENCOUNTER — HOSPITAL ENCOUNTER (OUTPATIENT)
Dept: PHYSICAL THERAPY | Age: 36
End: 2021-04-08
Payer: MEDICAID

## 2021-04-09 ENCOUNTER — TELEPHONE (OUTPATIENT)
Dept: INTERNAL MEDICINE CLINIC | Age: 36
End: 2021-04-09

## 2021-04-09 NOTE — TELEPHONE ENCOUNTER
Called out and spoke to pt. Two pt identifiers confirmed. Pt states having more frequent anxiety attacks: tightness in chest.  [facility] 3/25/21--given Abilify. Pt feels it is not helping. Pt cannot function at work. Pt is not on anything else as cymbalta gave pt SEs. Informed pt that [facility] will ask one of the Dr's if they can see pt for a VV today. Pt states that if she cannot, she will have to go back to [facility].   Will call pt back.

## 2021-04-09 NOTE — TELEPHONE ENCOUNTER
Per Dr. Sena Marin, pt can be seen today as VV at 1430. Called out and spoke to pt. Two pt identifiers confirmed. Informed pt that she can be seen w/ Dr. Sean Marin at [phone] via VV today. Pt states that she is at the hospital now--was driving there when talking earlier.   Pt states that she will bypass the appt and talk to someone at the hospital.

## 2021-04-09 NOTE — TELEPHONE ENCOUNTER
Josiane Cantrell   Phone Number:  [phone] (Call me)             Level 1/Escalated Issue       Caller's first and last name and relationship (if not the patient): N/A       Best contact number(s):  [phone]       What are the symptoms: Increased anxiety & depression       Transfer successful - yes/no (include outcome): no/Instructed by Ever Dubois to send a message       Transfer declined - yes/no (include reason): yes/request to send a message       Was caller advised to seek appropriate level of care - yes/no: yes       Details to clarify the request: N/A         Jack Mata     Copy/Paste JOSEERA

## 2021-05-06 NOTE — ANCILLARY DISCHARGE INSTRUCTIONS
[facility] Physical Therapy [address] [place]a Gregory Phone: [phone] Fax: [phone] Medicaid Discharge Summary  2-15 Patient name: Tylor Luis  : 1985  Provider#: [id number] Referral source: Jose Langston MD     
Medical/Treatment Diagnosis: Fibromyalgia [M79.7] Prior Hospitalization: see medical history Comorbidities: See Plan of Care Prior Level of Function:See Plan of Care Medications: Verified on Patient Summary List 
 
Start of Care: 21    Onset Date: Chronic Visits from Start of Care: 9    Missed Visits: 11 
Reporting Period : 21 to 21 ASSESSMENT/SUMMARY OF CARE: The patient is discharged today, 2021, as they have stopped attending therapy. Final objective data and outcomes were unable to be obtained. See goal results below from most recent progress note performed 21. Short Term Goals: To be accomplished in 3-6 treatments: 
             MHL patient will score >= 2/5 on Sahrmann Lower Abdominal Test toward improved endurance with functional activity. - Met 
            The patient will perform TUG in <= 11 seconds on average over two trials to demonstrate improved community ambulation potential. - Met Long Term Goals: To be accomplished in 8-12 treatments: 
             NDM patient will demonstrate gross multiplanar bilateral LE strength increased by >= 1/2 MMT grade toward improved functional endurance with ambulating and stair navigation. - Progressing 
            The patient will score >= 70 on FOTO to demonstrate significantly improved quality of life. - Progressing  
 
RECOMMENDATIONS: 
[x]Discontinue therapy: []Patient has reached or is progressing toward set goals [x]Patient is non-compliant or has abdicated 
    []Due to lack of appreciable progress towards set goals []Other Yolette Link PT, DPT 2021 ______________________________________________________________________ NOTE TO PHYSICIAN:  Please complete the following and fax to: [address] Physical Therapy: Fax: [phone]. Retain this original for your records. If you are unable to process this request in 24 hours, please contact our office. [de-identified] Signature:____________________  Date:____________Time:_________        Chase Hinojosa MD

## 2021-09-14 ENCOUNTER — TELEPHONE (OUTPATIENT)
Dept: INTERNAL MEDICINE CLINIC | Age: 36
End: 2021-09-14

## 2021-09-14 NOTE — TELEPHONE ENCOUNTER
----- Message from Hannah Palafox sent at 9/14/2021  3:42 PM EDT -----  Regarding: Dr. Amy Lim  Contact: [phone]  Level 1/Escalated Issue      Caller's first and last name and relationship (if not the patient): Pt. Best contact number(s): [phone]      What are the symptoms: Numbness in legs, sometimes in hands. Transfer successful - yes/no (include outcome): No, placed on hold. Transfer declined - yes/no (include reason): N/a. Was caller advised to seek appropriate level of care - yes/no: Yes. Details to clarify the request: Pt hasn't felt toe in days. Wants to be referred to Neurologist. No appointment until 9/17/21.          Message from [facility]

## 2021-09-15 NOTE — TELEPHONE ENCOUNTER
Called, spoke with Pt. Two pt identifiers confirmed. Pt given information for [employer] [facility] at [facility]. Pt verbalized understanding of information discussed w/ no further questions at this time.

## 2021-10-14 ENCOUNTER — APPOINTMENT (OUTPATIENT)
Dept: GENERAL RADIOLOGY | Age: 36
End: 2021-10-14
Attending: EMERGENCY MEDICINE
Payer: MEDICAID

## 2021-10-14 ENCOUNTER — NURSE TRIAGE (OUTPATIENT)
Dept: OTHER | Facility: CLINIC | Age: 36
End: 2021-10-14

## 2021-10-14 ENCOUNTER — TELEPHONE (OUTPATIENT)
Dept: INTERNAL MEDICINE CLINIC | Age: 36
End: 2021-10-14

## 2021-10-14 ENCOUNTER — HOSPITAL ENCOUNTER (EMERGENCY)
Age: 36
Discharge: HOME | End: 2021-10-14
Attending: EMERGENCY MEDICINE
Payer: MEDICAID

## 2021-10-14 ENCOUNTER — APPOINTMENT (OUTPATIENT)
Dept: GENERAL RADIOLOGY | Age: 36
End: 2021-10-14
Attending: PHYSICIAN ASSISTANT
Payer: MEDICAID

## 2021-10-14 VITALS
TEMPERATURE: 98 F | SYSTOLIC BLOOD PRESSURE: 114 MMHG | BODY MASS INDEX: 49.6 KG/M2 | HEIGHT: 60 IN | HEART RATE: 86 BPM | DIASTOLIC BLOOD PRESSURE: 64 MMHG | OXYGEN SATURATION: 98 % | RESPIRATION RATE: 16 BRPM | WEIGHT: 252.65 LBS

## 2021-10-14 DIAGNOSIS — R52 PAIN OF MULTIPLE SITES: ICD-10-CM
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
## 2021-10-14 PROCEDURE — 71101 X-RAY EXAM UNILAT RIBS/CHEST: CPT

## 2021-10-14 PROCEDURE — 73562 X-RAY EXAM OF KNEE 3: CPT

## 2021-10-14 PROCEDURE — 72220 X-RAY EXAM SACRUM TAILBONE: CPT

## 2021-10-14 PROCEDURE — 99282 EMERGENCY DEPT VISIT SF MDM: CPT

## 2021-10-14 PROCEDURE — 72072 X-RAY EXAM THORAC SPINE 3VWS: CPT

## 2021-10-14 RX ORDER — CYCLOBENZAPRINE HCL 10 MG
10 TABLET ORAL
Qty: 15 TABLET | Refills: 0 | Status: SHIPPED | OUTPATIENT
Start: 2021-10-14 | End: 2021-10-19

## 2021-10-14 RX ORDER — NAPROXEN 250 MG/1
250 TABLET ORAL 2 TIMES DAILY WITH MEALS
Qty: 14 TABLET | Refills: 0 | Status: SHIPPED | OUTPATIENT
Start: 2021-10-14 | End: 2021-10-21

## 2021-10-14 NOTE — TELEPHONE ENCOUNTER
#[phone] pt fell down steps on 10-13-21 landing on concrete. Pt has neck and back pain due to the fall. Pt also right side pain and is limping.     Please call to triage

## 2021-10-14 NOTE — ED PROVIDER NOTES
EMERGENCY DEPARTMENT HISTORY AND PHYSICAL EXAM      Date: 10/14/2021  Patient Name: Uche Gibbs    History of Presenting Illness     Chief Complaint   Patient presents with   [facility] Fall     pt fell down 5 brick steps onto concrete. Complaint of back pain just below the bra line on the right and right rib pain as well as rt knee pain. pt ambulatory to triage.  Back Pain    Rib Pain    Knee Pain       History Provided By: Patient    HPI: Uche Gibbs, 28 y.o. female with PMHx of fibromyalgia presents BIB self to the ED with cc of pain at multiple sites in setting of mechanical fall down her front steps. Patient reports tumbling down 5 steps, landing on the right side of her body. No head injury or LOC. She is not anticoagulated. She felt well last night, however today she woke up sore. She C/o pain at the R ribs, R shoulder, R knee, lower back. Ambulatory in the ED. Tried taking tylenol with some improvement in her pain. Patient is status post hysterectomy. There are no other complaints, changes, or physical findings at this time. PCP: Ángel Collazo MD    No current facility-administered medications on file prior to encounter. Current Outpatient Medications on File Prior to Encounter   Medication Sig Dispense Refill    ergocalciferol (ERGOCALCIFEROL) 1,250 mcg (50,000 unit) capsule Take 1 Cap by mouth every seven (7) days. 16 Cap 0    DULoxetine (CYMBALTA) 30 mg capsule Take 1 Cap by mouth daily. 30 Cap 1    naltrexone (DEPADE) 50 mg tablet Take 25 mg by mouth daily.       phentermine (ADIPEX-P) 37.5 mg tablet TK 1 T PO Q MORING         Past History     Past Medical History:  Past Medical History:   Diagnosis Date    Asthma     as a child     delivery     Fibromyalgia     Headache     migraine    Hernia, umbilical 6926    Psychiatric disorder     depression and anxiety       Past Surgical History:  Past Surgical History:   Procedure Laterality Date    HX  SECTION 2004, 2006, 2009    [facility] [address] (Dr Mary Nettles), [facility] [place] (Dr Mary Nettles)    [address]  06/2018    HX HYSTERECTOMY      HX HYSTERECTOMY  03/10/2020       Family History:  Family History   Problem Relation Age of Onset    Diabetes Father     Hypertension Father     Diabetes Maternal Grandmother     Cancer Paternal Aunt         breast    Hypertension Paternal Grandmother     Diabetes Paternal Grandmother     Cancer Paternal Grandmother         lung    Cancer Paternal Grandfather         pancreas       Social History:  Social History     Tobacco Use    Smoking status: Former Smoker     Packs/day: 0.25     Years: 5.00     Pack years: 1.25    Smokeless tobacco: Never Used   Substance Use Topics    Alcohol use: Yes     Alcohol/week: 2.0 standard drinks     Types: 2 Cans of beer per week    Drug use: No       Allergies: Allergies   Allergen Reactions    Iodine Unknown (comments)     Topical only    Pcn [Penicillins] Other (comments)     Unknown her mother told her she was         Review of Systems   Review of Systems   Constitutional: Negative for fever. Musculoskeletal: Positive for arthralgias and back pain. Skin: Negative for wound. Neurological: Negative for syncope. Hematological: Does not bruise/bleed easily. Physical Exam   Physical Exam  Vitals and nursing note reviewed. Constitutional:       General: She is not in acute distress. Appearance: Normal appearance. She is well-developed. She is obese. She is not toxic-appearing. HENT:      Head: Normocephalic and atraumatic. Right Ear: Tympanic membrane normal.      Left Ear: Tympanic membrane normal.      Nose: Nose normal.      Mouth/Throat:      Mouth: Mucous membranes are moist.   Eyes:      General: Lids are normal.      Extraocular Movements: Extraocular movements intact. Conjunctiva/sclera: Conjunctivae normal.      Pupils: Pupils are equal, round, and reactive to light.    Cardiovascular:      Rate and Rhythm: Normal rate and regular rhythm. Pulmonary:      Effort: Pulmonary effort is normal.      Breath sounds: Normal breath sounds. Abdominal:      Palpations: Abdomen is soft. Tenderness: There is no abdominal tenderness. There is no right CVA tenderness, left CVA tenderness, guarding or rebound. Musculoskeletal:         General: Tenderness (R lateral ribs, R knee, lumbosacral midline) present. No swelling or deformity. Normal range of motion. Cervical back: Normal range of motion and neck supple. Comments: 5/5 strength and sensation b/l UE/LEs. Gait is steady and symmetrical.   Skin:     General: Skin is warm and dry. Comments: Skin intact throughout, no abrasions noted   Neurological:      General: No focal deficit present. Mental Status: She is alert and oriented to person, place, and time. Cranial Nerves: No cranial nerve deficit. Sensory: No sensory deficit. Motor: No weakness. Coordination: Coordination normal.      Gait: Gait normal.   Psychiatric:         Mood and Affect: Mood normal.         Behavior: Behavior normal. Behavior is cooperative. Diagnostic Study Results     Labs -   No results found for this or any previous visit (from the past 12 hour(s)). Radiologic Studies -   XR KNEE RT 3 V   Final Result   1. No displaced right rib fractures. 2.  No thoracic spine compression fracture nor subluxation. 3.  No acute fracture of the right knee, sacrum, nor coccyx. XR RIBS RT W PA CXR MIN 3 V   Final Result   1. No displaced right rib fractures. 2.  No thoracic spine compression fracture nor subluxation. 3.  No acute fracture of the right knee, sacrum, nor coccyx. XR SACRUM AND COCCYX   Final Result   1. No displaced right rib fractures. 2.  No thoracic spine compression fracture nor subluxation. 3.  No acute fracture of the right knee, sacrum, nor coccyx. XR SPINE THORAC 3 V   Final Result   1.   No displaced right rib fractures. 2.  No thoracic spine compression fracture nor subluxation. 3.  No acute fracture of the right knee, sacrum, nor coccyx. CT Results  (Last 48 hours)    None        CXR Results  (Last 48 hours)    None            Medical Decision Making   I am the first provider for this patient. I reviewed the vital signs, available nursing notes, past medical history, past surgical history, family history and social history. Vital Signs-Reviewed the patient's vital signs. Patient Vitals for the past 12 hrs:   Temp Pulse Resp BP SpO2   10/14/21 1249 98 °F (36.7 °C) 86 16 114/64 98 %       Records Reviewed: Nursing Notes    Provider Notes (Medical Decision Making):   Reviewed x-ray findings with patient. She is agreeable to discharge with plans for NSAIDs, rice treatment. Follow-up with PCP. ED return precautions given. ED Course:   Initial assessment performed. The patients presenting problems have been discussed, and they are in agreement with the care plan formulated and outlined with them. I have encouraged them to ask questions as they arise throughout their visit. Critical Care Time: None    Disposition:  D/c    PLAN:  1. Current Discharge Medication List      START taking these medications    Details   naproxen (NAPROSYN) 250 mg tablet Take 1 Tablet by mouth two (2) times daily (with meals) for 7 days. Qty: 14 Tablet, Refills: 0  Start date: 10/14/2021, End date: 10/21/2021      cyclobenzaprine (FLEXERIL) 10 mg tablet Take 1 Tablet by mouth three (3) times daily as needed for Muscle Spasm(s) for up to 5 days. Qty: 15 Tablet, Refills: 0  Start date: 10/14/2021, End date: 10/19/2021           2.    Follow-up Information     Follow up With Specialties Details Why Contact Info    [facility] EMERGENCY DEPT Emergency Medicine  As needed, If symptoms worsen [address]    Olivia Gomez MD Internal Medicine Call  For follow up [phone] [facility] [address]  [facility]  [phone]          Return to ED if worse     Diagnosis     Clinical Impression:   1. Fall down steps, initial encounter    2. Pain of multiple sites          Please note that this dictation was completed with [employer], the computer voice recognition software. Quite often unanticipated grammatical, syntax, homophones, and other interpretive errors are inadvertently transcribed by the computer software. Please disregards these errors. Please excuse any errors that have escaped final proofreading.
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
## 2021-10-14 NOTE — Clinical Note
[address]  [facility] EMERGENCY DEPT  [address] [place] [address]  Ana Kay [address]  [phone]    Work/School Note    Date: 10/14/2021    To Whom It May concern:    Larisa Ritter was seen and treated today in the emergency room by the following provider(s):  Attending Provider: Fermín Hubbard MD  Physician Assistant: Jose A Tran. Larisa Ritter is excused from work/school on 10/14/2021 through 10/17/2021. She is medically clear to return to work/school on 10/18/2021.         Sincerely,          Jose A Dimas

## 2021-10-14 NOTE — ED NOTES
1346: Pt to Xray at this time. 1445: Sybil. PA has reviewed discharge instructions with the patient. The patient verbalized understanding. Patient ambulatory out of ED at this time. . Patient made aware of prescriptions to be picked up at pharmacy.

## 2021-11-29 ENCOUNTER — OFFICE VISIT (OUTPATIENT)
Dept: NEUROLOGY | Age: 36
End: 2021-11-29
Payer: MEDICAID

## 2021-11-29 VITALS
OXYGEN SATURATION: 97 % | SYSTOLIC BLOOD PRESSURE: 110 MMHG | HEART RATE: 111 BPM | WEIGHT: 256.6 LBS | HEIGHT: 60 IN | DIASTOLIC BLOOD PRESSURE: 70 MMHG | BODY MASS INDEX: 50.38 KG/M2

## 2021-11-29 DIAGNOSIS — R53.1 PARESTHESIAS WITH SUBJECTIVE WEAKNESS: Primary | ICD-10-CM

## 2021-11-29 DIAGNOSIS — R20.2 PARESTHESIAS WITH SUBJECTIVE WEAKNESS: Primary | ICD-10-CM

## 2021-11-29 PROCEDURE — 99204 OFFICE O/P NEW MOD 45 MIN: CPT | Performed by: PSYCHIATRY & NEUROLOGY

## 2021-11-29 RX ORDER — TRAZODONE HYDROCHLORIDE 50 MG/1
50 TABLET ORAL
COMMUNITY
Start: 2021-10-13

## 2021-11-29 RX ORDER — GABAPENTIN 300 MG/1
CAPSULE ORAL
COMMUNITY
Start: 2021-10-13
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
## 2022-01-11 ENCOUNTER — OFFICE VISIT (OUTPATIENT)
Dept: NEUROLOGY | Age: 37
End: 2022-01-11
Payer: MEDICAID

## 2022-01-11 DIAGNOSIS — G63 POLYNEUROPATHY ASSOCIATED WITH UNDERLYING DISEASE (HCC): Primary | ICD-10-CM

## 2022-01-11 PROCEDURE — 95886 MUSC TEST DONE W/N TEST COMP: CPT | Performed by: PSYCHIATRY & NEUROLOGY

## 2022-01-11 PROCEDURE — 95910 NRV CNDJ TEST 7-8 STUDIES: CPT | Performed by: PSYCHIATRY & NEUROLOGY

## 2022-01-11 NOTE — PROGRESS NOTES
[address] [facility] Neurology [facility]  [address] [facility], [address] [place] [address]  Phone [phone] Fax [phone]  Test Date:  2022    Patient: Warden Fuentes : 1985 Physician: Veronica Nicole MD   Sex: Female Height: 5' 0\" Ref Phys: Veronica Nicole MD   ID#: [id number]  Weight: 256 lbs. Technician: Milena Odom     Patient Complaints:    Bilateral lower extremity numbness left greater than right    Patient History / Exam:    Ms. Dixon Klein is a 39year old RH dominant female who presents to [facility] Neurology EMG lab for further evaluation bilateral lower extremity sensory complaints. Examination is as documented in recent clinic visit by myself. Referred for evaluation of lumbosacral polyradiculopathy versus peripheral neuropathy. NCV & EMG Findings:  Evaluation of the left Sup Peroneal sensory and the right Sup Peroneal sensory nerves showed no response (14 cm). All remaining nerves  were within normal limits. All left vs. right side differences were within normal limits. All F Wave latencies were within normal limits. Electromyography (EMG) left EDB, tibialis anterior, medial gastronemius and vastus lateralis were unremarkable. 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     ___________________________  Veronica Nicole MD        Nerve Conduction Studies  Anti Sensory Summary Table     Stim Site NR Peak (ms) Norm Peak (ms) P-T Amp (µV) Norm P-T Amp Onset (ms) Site1 Site2 Delta-P (ms) Dist (cm) Rob (m/s) Norm Rob (m/s)   Left Sup Peroneal Anti Sensory (Ant Lat Mall)  30°C   14 cm NR  <4.4  >5.0  14 cm Ant Lat Mall  14.0  >32   Right Sup Peroneal Anti Sensory (Ant Lat Mall)  34.1°C   14 cm NR  <4.4  >5.0  14 cm Ant Lat Mall  14.0  >32   Left Sural Anti Sensory (Lat Mall)  30.1°C   Calf    3.5 <4.0 19.5 >5.0 2.9 Calf Lat Mall 3. 5 14.0 40 >35   Site 2    3.5  20.5  2.9         Right Sural Anti Sensory (Lat Mall)  31.8°C   Calf    3.1 <4.0 33.6 >5.0 2.4 Calf Lat Mall 3.1 14.0 45 >35     Motor Summary Table     Stim Site NR Onset (ms) Norm Onset (ms) O-P Amp (mV) Norm O-P Amp Site1 Site2 Delta-0 (ms) Dist (cm) Rob (m/s) Norm Rob (m/s)   Left Peroneal Motor (Ext Dig Brev)  30.6°C   Ankle    3.2 <6.1 5.4 >2.5 B Fib Ankle 5.4 33.0 61 >38   B Fib    8.6  4.8  Poplt B Fib 1.5 10.0 67 >40   Poplt    10.1  4.3          Right Peroneal Motor (Ext Dig Brev)  31.3°C   Ankle    2.6 <6.1 4.3 >2.5 B Fib Ankle 6.3 32.0 51 >38   B Fib    8.9  2.8  Poplt B Fib 1.5 10.0 67 >40   Poplt    10.4  2.8          Left Tibial Motor (Abd Davila Brev)  30.1°C   Ankle    3.0 <6.1 11.9 >3.0 Knee Ankle 7.5 32.0 43 >35   Knee    10.5  10.0          Right Tibial Motor (Abd Davila Brev)  29.9°C   Ankle    3.7 <6.1 13.4 >3.0 Knee Ankle 7.3 33.0 45 >35   Knee    11.0  12.5            F Wave Studies     NR F-Lat (ms) Lat Norm (ms) L-R F-Lat (ms) L-R Lat Norm   Left Tibial (Mrkrs) (Abd Hallucis)  30.1°C      44.12 <61  <5.7     EMG     Side Muscle Nerve Root Ins Act Fibs Psw Amp Dur Poly Recrt Int Pat Comment   Left Ext Dig Brev Dp Br Peronel L5, S1 Nml Nml Nml Nml Nml 0 Nml Nml    Left AntTibialis Dp Br Peronel L4-5 Nml Nml Nml Nml Nml 0 Nml Nml    Left Gastroc Tibial S1-2 Nml Nml Nml Nml Nml 0 Nml Nml    Left Flex Dig Long Tibial L5-S2 Nml Nml Nml Nml Nml 0 Nml Nml    Left VastusLat Femoral L2-4 Nml Nml Nml Nml Nml 0 Nml Nml    Left Lumbo Parasp Low Rami L5-S1 Nml Nml Nml         Right AntTibialis Dp Br Peronel L4-5 Nml Nml Nml Nml Nml 0 Nml Nml    Right Peroneus Long Sup Br Peronel L5-S1 Nml Nml Nml Nml Nml 0 Nml Nml    Right Gastroc Tibial S1-2 Nml Nml Nml Nml Nml 0 Nml Nml    Right Flex Dig Long Tibial L5-S2 Nml Nml Nml Nml Nml 0 Nml Nml    Right VastusLat Femoral L2-4 Nml Nml Nml Nml Nml 0 Nml Nml    Right Lumbo Parasp Low Rami L5-S1 Nml Nml Nml               Waveforms:

## 2022-01-14 LAB
ALBUMIN SERPL ELPH-MCNC: 3.7 G/DL (ref 2.9–4.4)
ALBUMIN/GLOB SERPL: 1.1 {RATIO} (ref 0.7–1.7)
ALPHA1 GLOB SERPL ELPH-MCNC: 0.2 G/DL (ref 0–0.4)
ALPHA2 GLOB SERPL ELPH-MCNC: 0.7 G/DL (ref 0.4–1)
ANA SER QL: NEGATIVE
B-GLOBULIN SERPL ELPH-MCNC: 1.3 G/DL (ref 0.7–1.3)
GAMMA GLOB SERPL ELPH-MCNC: 1.2 G/DL (ref 0.4–1.8)
GLOBULIN SER-MCNC: 3.4 G/DL (ref 2.2–3.9)
IGA SERPL-MCNC: 355 MG/DL (ref 87–352)
IGG SERPL-MCNC: 1292 MG/DL (ref 586–1602)
IGM SERPL-MCNC: 147 MG/DL (ref 26–217)
INTERPRETATION SERPL IEP-IMP: ABNORMAL
M PROTEIN SERPL ELPH-MCNC: ABNORMAL G/DL
PLEASE NOTE:, 149534: ABNORMAL
PROT SERPL-MCNC: 7.1 G/DL (ref 6–8.5)
VIT B12 SERPL-MCNC: 471 PG/ML (ref 232–1245)

## 2022-01-17 DIAGNOSIS — G62.9 PERIPHERAL POLYNEUROPATHY: Primary | ICD-10-CM

## 2022-03-19 PROBLEM — M79.7 FIBROMYALGIA: Status: ACTIVE | Noted: 2020-03-19

## 2022-03-19 PROBLEM — E66.01 MORBID OBESITY WITH BODY MASS INDEX (BMI) OF 40.0 TO 49.9 (HCC): Status: ACTIVE | Noted: 2018-03-19
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
## 2023-01-10 ENCOUNTER — OFFICE VISIT (OUTPATIENT)
Dept: INTERNAL MEDICINE CLINIC | Age: 38
End: 2023-01-10

## 2023-01-10 VITALS
HEIGHT: 60 IN | OXYGEN SATURATION: 95 % | RESPIRATION RATE: 16 BRPM | DIASTOLIC BLOOD PRESSURE: 66 MMHG | TEMPERATURE: 97.9 F | SYSTOLIC BLOOD PRESSURE: 112 MMHG | HEART RATE: 102 BPM | WEIGHT: 256 LBS | BODY MASS INDEX: 50.26 KG/M2

## 2023-01-10 DIAGNOSIS — Z00.00 PHYSICAL EXAM: Primary | ICD-10-CM

## 2023-01-10 DIAGNOSIS — E66.01 MORBID OBESITY WITH BODY MASS INDEX (BMI) OF 40.0 TO 49.9 (HCC): ICD-10-CM

## 2023-01-10 DIAGNOSIS — G62.9 NEUROPATHY: ICD-10-CM

## 2023-01-10 DIAGNOSIS — Z23 NEEDS FLU SHOT: ICD-10-CM

## 2023-01-10 DIAGNOSIS — E55.9 VITAMIN D DEFICIENCY: ICD-10-CM

## 2023-01-10 DIAGNOSIS — R73.01 IFG (IMPAIRED FASTING GLUCOSE): ICD-10-CM

## 2023-01-10 DIAGNOSIS — F32.A ANXIETY AND DEPRESSION: ICD-10-CM

## 2023-01-10 DIAGNOSIS — F41.9 ANXIETY AND DEPRESSION: ICD-10-CM

## 2023-01-10 DIAGNOSIS — M79.7 FIBROMYALGIA: ICD-10-CM

## 2023-01-10 PROCEDURE — 99213 OFFICE O/P EST LOW 20 MIN: CPT | Performed by: INTERNAL MEDICINE

## 2023-01-10 PROCEDURE — 90686 IIV4 VACC NO PRSV 0.5 ML IM: CPT | Performed by: INTERNAL MEDICINE

## 2023-01-10 PROCEDURE — 99395 PREV VISIT EST AGE 18-39: CPT | Performed by: INTERNAL MEDICINE

## 2023-01-10 PROCEDURE — 90471 IMMUNIZATION ADMIN: CPT | Performed by: INTERNAL MEDICINE

## 2023-01-10 NOTE — PROGRESS NOTES
1. \"Have you been to the ER, urgent care clinic since your last visit? Hospitalized since your last visit? \" No    2. \"Have you seen or consulted any other health care providers outside of the [address] [place] [address] since your last visit? \" No     3. For patients aged 39-70: Has the patient had a colonoscopy / FIT/ Cologuard? NA - based on age      If the patient is female:    4. For patients aged 41-77: Has the patient had a mammogram within the past 2 years? NA - based on age or sex      11. For patients aged 21-65: Has the patient had a pap smear? Yes - Care Gap present.  Most recent result on file

## 2023-01-10 NOTE — PROGRESS NOTES
Pt was last here vv 21. Here for cpe    She wonders about a colonoscopy as her father was recently dx'd w/ colon cancer (at age 61). Discussed she would likely be due at age 36, but discussed following up with her dad's GI for his recommendation regarding doing this any earlier than age 36. BP is 112/66     Wt today is 256 lbs, up 40 lbs since lov   She previously was taking adipex and naltrexone through nutritionist at gyn, no longer going    Discussed [employer], will write letter for insurance. Reminded pt to complete labs         No longer taking vit D, discussed restarting     She had hysterectomy      PREVENTIVE:  Colonoscopy: not yet needed, will meet with GI at age 36, fmhx colon cancer, father (age 61) stage 4  Dexa: not yet needed  Mammo: not yet needed  Pap: hysterectomy , due reminded   Tdap: 3/19/18  Pneumovax: not yet needed  Shingrix: not yet needed  Flu shot: will get today 01/10/23  Eye exam:  Dr. Donaldo Vazquez  Lipids:  LDL 99  A1c: 9/15 6.0  5.6    Patient Active Problem List    Diagnosis Date Noted    Fibromyalgia 2020    Morbid obesity with body mass index (BMI) of 40.0 to 49.9 [facility] 2018    Prediabetes 2015     Current Outpatient Medications   Medication Sig Dispense Refill    gabapentin (NEURONTIN) 300 mg capsule TAKE 1 CAPSULE BY MOUTH TWICE DAILY AS NEEDED      traZODone (DESYREL) 50 mg tablet Take 50 mg by mouth nightly.        ergocalciferol (ERGOCALCIFEROL) 1,250 mcg (50,000 unit) capsule Take 1 Cap by mouth every seven (7) days.  16 Cap 0     Past Surgical History:   Procedure Laterality Date    HX  SECTION  2004, 2006, 2009    Мария Mccoy (Dr Hardeep Loera), [facility] (Dr Hardeep Loera)    [address]  2018    HX HYSTERECTOMY      HX HYSTERECTOMY  03/10/2020      Lab Results   Component Value Date/Time    WBC 8.6 2021 11:51 AM    HGB 14.2 2021 11:51 AM    HCT 42.5 2021 11:51 AM    PLATELET 239  11:51 AM    MCV 86 01/22/2021 11:51 AM     Lab Results   Component Value Date/Time    Cholesterol, total 176 01/22/2021 11:51 AM    HDL Cholesterol 47 01/22/2021 11:51 AM    LDL, calculated 99 01/22/2021 11:51 AM    LDL, calculated 83 09/23/2015 11:04 AM    Triglyceride 174 (H) 01/22/2021 11:51 AM     Lab Results   Component Value Date/Time    GFR est non- 01/22/2021 11:51 AM    GFR est  01/22/2021 11:51 AM    Creatinine 0.68 01/22/2021 11:51 AM    BUN 6 01/22/2021 11:51 AM    Sodium 139 01/22/2021 11:51 AM    Potassium 4.4 01/22/2021 11:51 AM    Chloride 103 01/22/2021 11:51 AM    CO2 26 01/22/2021 11:51 AM        Review of Systems   Respiratory:  Negative for shortness of breath. Cardiovascular:  Negative for chest pain. Psychiatric/Behavioral:  Positive for depression. Physical Exam  Constitutional:       General: She is not in acute distress. Appearance: She is not ill-appearing, toxic-appearing or diaphoretic. HENT:      Head: Normocephalic and atraumatic. Right Ear: External ear normal.      Left Ear: External ear normal.   Eyes:      General:         Right eye: No discharge. Left eye: No discharge. Conjunctiva/sclera: Conjunctivae normal.      Pupils: Pupils are equal, round, and reactive to light. Neck:      Vascular: No carotid bruit. Cardiovascular:      Rate and Rhythm: Normal rate and regular rhythm. Heart sounds: No murmur heard. No friction rub. No gallop. Pulmonary:      Effort: No respiratory distress. Breath sounds: Normal breath sounds. No wheezing or rales. Chest:      Chest wall: No tenderness. Musculoskeletal:         General: Normal range of motion. Cervical back: Normal.   Skin:     General: Skin is warm and dry. Neurological:      Mental Status: She is oriented to person, place, and time. Mental status is at baseline.       Coordination: Coordination normal.      Gait: Gait normal.   Psychiatric:         Mood and Affect: Mood normal. Behavior: Behavior normal.       ASSESSMENT and PLAN    ICD-10-CM ICD-9-CM    1. Physical exam  Z00.00 V70.9 HEPATITIS C AB      LIPID PANEL   Discussed need for diet and weight loss discussed weight watchers we will give letter for work for this is covered with work insurance plan    Check labs    Blood pressure okay    Flu shot today    She reports eye exam is up-to-date    Pelvic exam is due and she will schedule    Will need colonoscopy by age 36 she will discuss with her dad's gastroenterologist if they recommend initial colonoscopy now versus starting at age 36 she has no symptoms discussed all the above with her   METABOLIC PANEL, COMPREHENSIVE      HEMOGLOBIN A1C WITH EAG      TSH 3RD GENERATION      CBC W/O DIFF      VITAMIN D, 25 HYDROXY        E66.01 278.01 HEPATITIS C AB      LIPID PANEL      METABOLIC PANEL, COMPREHENSIVE      HEMOGLOBIN A1C WITH EAG      TSH 3RD GENERATION      CBC W/O DIFF      VITAMIN D, 25 HYDROXY        G62.9 355.9 HEPATITIS C AB      LIPID PANEL      METABOLIC PANEL, COMPREHENSIVE      HEMOGLOBIN A1C WITH EAG      TSH 3RD GENERATION      CBC W/O DIFF      VITAMIN D, 25 HYDROXY        R73.01 790.21 HEPATITIS C AB      LIPID PANEL      METABOLIC PANEL, COMPREHENSIVE      HEMOGLOBIN A1C WITH EAG      TSH 3RD GENERATION      CBC W/O DIFF      VITAMIN D, 25 HYDROXY        M79.7 729.1 REFERRAL TO PHYSICAL THERAPY      HEPATITIS C AB      LIPID PANEL      METABOLIC PANEL, COMPREHENSIVE      HEMOGLOBIN A1C WITH EAG      TSH 3RD GENERATION      CBC W/O DIFF      VITAMIN D, 25 HYDROXY      REFERRAL TO RHEUMATOLOGY        F41.9 300.00 HEPATITIS C AB    F32. A 311 LIPID PANEL      METABOLIC PANEL, COMPREHENSIVE      HEMOGLOBIN A1C WITH EAG      TSH 3RD GENERATION      CBC W/O DIFF      VITAMIN D, 25 HYDROXY        E55.9 268.9 HEPATITIS C AB      LIPID PANEL      METABOLIC PANEL, COMPREHENSIVE      HEMOGLOBIN A1C WITH EAG      TSH 3RD GENERATION      CBC W/O DIFF      VITAMIN D, 25 HYDROXY Scribed by Chay Almaguer, as dictated by Dr. Patrizia Mariano. Current diagnosis and concerns discussed with pt at length. Pt understands risks and benefits or current treatment plan and medications, and accepts the treatment and medication with any possible risks. Pt asks appropriate questions, which were answered. Pt was instructed to call with any concerns or problems. I have reviewed the note documented by the scribe. The services provided are my own. The documentation is accurate.

## 2023-01-10 NOTE — LETTER
1/10/2023 9:11 AM    Ms. Leny Licona  [address] [place] [address]    Patient would benefit from starting weight watchers due to medical conditions. If any questions please give our office a call back at [phone].           Sincerely,      Bea Khan MD

## 2023-01-11 LAB
25(OH)D3 SERPL-MCNC: 14.7 NG/ML (ref 30–100)
ALBUMIN SERPL-MCNC: 3.3 G/DL (ref 3.5–5)
ALBUMIN/GLOB SERPL: 0.9 (ref 1.1–2.2)
ALP SERPL-CCNC: 68 U/L (ref 45–117)
ALT SERPL-CCNC: 15 U/L (ref 12–78)
ANION GAP SERPL CALC-SCNC: 5 MMOL/L (ref 5–15)
AST SERPL-CCNC: 12 U/L (ref 15–37)
BILIRUB SERPL-MCNC: 0.3 MG/DL (ref 0.2–1)
BUN SERPL-MCNC: 8 MG/DL (ref 6–20)
BUN/CREAT SERPL: 10 (ref 12–20)
CALCIUM SERPL-MCNC: 9.2 MG/DL (ref 8.5–10.1)
CHLORIDE SERPL-SCNC: 106 MMOL/L (ref 97–108)
CHOLEST SERPL-MCNC: 169 MG/DL
CO2 SERPL-SCNC: 29 MMOL/L (ref 21–32)
CREAT SERPL-MCNC: 0.77 MG/DL (ref 0.55–1.02)
ERYTHROCYTE [DISTWIDTH] IN BLOOD BY AUTOMATED COUNT: 13.4 % (ref 11.5–14.5)
EST. AVERAGE GLUCOSE BLD GHB EST-MCNC: 131 MG/DL
GLOBULIN SER CALC-MCNC: 3.7 G/DL (ref 2–4)
GLUCOSE SERPL-MCNC: 153 MG/DL (ref 65–100)
HBA1C MFR BLD: 6.2 % (ref 4–5.6)
HCT VFR BLD AUTO: 43.3 % (ref 35–47)
HCV AB SERPL QL IA: NONREACTIVE
HDLC SERPL-MCNC: 48 MG/DL
HDLC SERPL: 3.5 (ref 0–5)
HGB BLD-MCNC: 13 G/DL (ref 11.5–16)
LDLC SERPL CALC-MCNC: 100.6 MG/DL (ref 0–100)
MCH RBC QN AUTO: 26.6 PG (ref 26–34)
MCHC RBC AUTO-ENTMCNC: 30 G/DL (ref 30–36.5)
MCV RBC AUTO: 88.7 FL (ref 80–99)
NRBC # BLD: 0 K/UL (ref 0–0.01)
NRBC BLD-RTO: 0 PER 100 WBC
PLATELET # BLD AUTO: 302 K/UL (ref 150–400)
PMV BLD AUTO: 9.5 FL (ref 8.9–12.9)
POTASSIUM SERPL-SCNC: 4.1 MMOL/L (ref 3.5–5.1)
PROT SERPL-MCNC: 7 G/DL (ref 6.4–8.2)
RBC # BLD AUTO: 4.88 M/UL (ref 3.8–5.2)
SODIUM SERPL-SCNC: 140 MMOL/L (ref 136–145)
TRIGL SERPL-MCNC: 102 MG/DL (ref ?–150)
TSH SERPL DL<=0.05 MIU/L-ACNC: 0.96 UIU/ML (ref 0.36–3.74)
VLDLC SERPL CALC-MCNC: 20.4 MG/DL
WBC # BLD AUTO: 8.7 K/UL (ref 3.6–11)

## 2023-01-11 NOTE — PROGRESS NOTES
Please call patient back about results  vit D is low--the patient needs 50,000 units weekly for 8 weeks then start a daily 2000unit supplement instead. the patient has mild prediabetes on bloodwork, have them work on diet/weight loss to improve this, work on avoiding simple carbohydrates (\"whites\"--white bread, white rice, white pasta, etc.. ) to improve further, no medications needed for now will monitor bloodwork   We discussed ozempic or trulicity in clinic--this would be reasonable to start if she is interested. See what is covered by her insurance.  either 0.25mg weekly ozempic or 5.36UO weekly trulicity

## 2023-01-12 RX ORDER — ERGOCALCIFEROL 1.25 MG/1
50000 CAPSULE ORAL
Qty: 8 CAPSULE | Refills: 0 | Status: SHIPPED | OUTPATIENT
Start: 2023-01-12

## 2023-01-12 NOTE — TELEPHONE ENCOUNTER
Future Appointments:  No future appointments. Last Appointment With Me:  1/10/2023     Requested Prescriptions     Pending Prescriptions Disp Refills    ergocalciferol (ERGOCALCIFEROL) 1,250 mcg (50,000 unit) capsule 8 Capsule 0     Sig: Take 1 Capsule by mouth every seven (7) days. semaglutide (OZEMPIC) 0.25 mg or 0.5 mg/dose (2 mg/1.5 ml) subq pen 1 Box 0     Si.25 mg by SubCUTAneous route every seven (7) days.

## 2023-01-12 NOTE — PROGRESS NOTES
Called, spoke to pt  Received two pt identifiers  Pt informed per Dr. Leidy Lee vit D is low--the patient needs 50,000 units weekly for 8 weeks then start a daily 2000unit supplement instead. ----- pended  Pt informed per Dr. Leidy Lee mild prediabetes on bloodwork, have them work on diet/weight loss to improve this, work on avoiding simple carbohydrates (\"whites\"--white bread, white rice, white pasta, etc.. ) to improve further, no medications needed for now will monitor bloodwork   Pt informed per Dr. Leidy Lee discussed ozempic or trulicity in clinic--this would be reasonable to start if she is interested. Pt agrees to start ozempic---pended  Pt verbalizes understanding of the instructions and has no further questions at this time.

## 2023-01-24 ENCOUNTER — HOSPITAL ENCOUNTER (OUTPATIENT)
Dept: PHYSICAL THERAPY | Age: 38
Discharge: HOME | End: 2023-01-24
Payer: MEDICAID

## 2023-01-24 DIAGNOSIS — M79.7 FIBROMYALGIA: Primary | ICD-10-CM

## 2023-01-24 PROCEDURE — 97162 PT EVAL MOD COMPLEX 30 MIN: CPT | Performed by: PHYSICAL THERAPIST
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
## 2023-02-01 ENCOUNTER — HOSPITAL ENCOUNTER (OUTPATIENT)
Dept: PHYSICAL THERAPY | Age: 38
Discharge: HOME | End: 2023-02-01
Payer: MEDICAID

## 2023-02-01 PROCEDURE — 97110 THERAPEUTIC EXERCISES: CPT

## 2023-02-01 NOTE — PROGRESS NOTES
PT DAILY TREATMENT NOTE 2-15    Patient Name: Mark Johnston  Date:2023  : 1985  [x]  Patient  Verified  Payor: Heath Woodward / Plan: 1 [facility] 270 / Product Type: Managed Care Medicaid /    In time: 310  Out time: 354  Total Treatment Time (min): 44  Visit #:  2    Treatment Area: Fibromyalgia [M79.7]    SUBJECTIVE  Pain Level (0-10 scale): 3  Any medication changes, allergies to medications, adverse drug reactions, diagnosis change, or new procedure performed?: [x] No    [] Yes (see summary sheet for update)  Subjective functional status/changes:   [] No changes reported  Patient noted they have been able to continue working on exercises at home, usually perform them before bed. Also noted they did not go into a flare up following previous treatment session but did note increased soreness. OBJECTIVE    44 min Therapeutic Exercise:  [x] See flow sheet :   Rationale: increase ROM, increase strength, improve coordination, improve balance, and increase proprioception to improve the patients ability to perform daily activities. With   [] TE   [] TA   [] Neuro   [] SC   [] other: Patient Education: [x] Review HEP    [] Progressed/Changed HEP based on:   [] positioning   [] body mechanics   [] transfers   [] heat/ice application    [] other:      Other Objective/Functional Measures: NA     Pain Level (0-10 scale) post treatment: 5/10    ASSESSMENT/Changes in Function:   Patient tolerated treatment session moderately, able to perform exercises and progressions. Patient continues to note increased pain with weightbearing positions and increased movement. Patient was able to progress with strengthening during therex today. Continue to progress as tolerated.    Patient will continue to benefit from skilled PT services to modify and progress therapeutic interventions, address functional mobility deficits, address ROM deficits, address strength deficits, analyze and address soft tissue restrictions, analyze and cue movement patterns, analyze and modify body mechanics/ergonomics, and assess and modify postural abnormalities to attain remaining goals. [x]  See Plan of Care  []  See progress note/recertification  []  See Discharge Summary         Progress towards goals / Updated goals:  Short Term Goals: To be accomplished in 8-10 treatments:               Pt will be consistent and demonstrate I with HEP  Pt will complain of pain 3-4/10 with all activity  Pt will demonstrate improved ROM to complete work duties and all household chores more efficiently  Pt will be able to maintain positions for 45 min without increased symptoms   Long Term Goals:  To be accomplished in 20-24 treatments:               Pt will complain of pain 0-1/10 with all activity  Pt will increase strength to stabilize her core and maintain better posture with all activity  Pt will increase FOTO score by 7 points to meet MDC and demonstrate improved function with all activity  Pt will be able to implement a thorough gym routine to strengthen and stabilize her spine to perform all activity with decreased symptoms  Pt will use proper form and posture to complete all work tasks without increased symptoms 100% of the time    PLAN  [x]  Upgrade activities as tolerated     [x]  Continue plan of care  [x]  Update interventions per flow sheet       []  Discharge due to:_  []  Other:_      Jose Martin Johnston, PTA 2/1/2023

## 2023-02-06 ENCOUNTER — APPOINTMENT (OUTPATIENT)
Dept: PHYSICAL THERAPY | Age: 38
End: 2023-02-06
Payer: MEDICAID

## 2023-02-08 ENCOUNTER — HOSPITAL ENCOUNTER (OUTPATIENT)
Dept: PHYSICAL THERAPY | Age: 38
Discharge: HOME | End: 2023-02-08
Payer: MEDICAID

## 2023-02-08 PROCEDURE — 97110 THERAPEUTIC EXERCISES: CPT

## 2023-02-08 NOTE — PROGRESS NOTES
PT DAILY TREATMENT NOTE 2-15    Patient Name: Teri Chawla  Date:2023  : 1985  [x]  Patient  Verified  Payor: [place] [facility] / Plan: 1 [facility] 270 / Product Type: Managed Care Medicaid /    In time: 310  Out time: 358  Total Treatment Time (min): 48  Visit #:  3    Treatment Area: Fibromyalgia [M79.7]    SUBJECTIVE  Pain Level (0-10 scale): 6  Any medication changes, allergies to medications, adverse drug reactions, diagnosis change, or new procedure performed?: [x] No    [] Yes (see summary sheet for update)  Subjective functional status/changes:   [] No changes reported  Patient noted they had some soreness following previous treatment. Fri-Monday they had a flare up that they are still somewhat dealing with, noted it is mostly in their head, noted they were experiencing increased amounts of brain fog during flare up. Patient noted they have continued working on their exercises since previous treatment session. OBJECTIVE    48 min Therapeutic Exercise:  [x] See flow sheet :   Rationale: increase ROM, increase strength, improve coordination, improve balance, and increase proprioception to improve the patients ability to perform daily activities. With   [x] TE   [] TA   [] Neuro   [] SC   [] other: Patient Education: [x] Review HEP    [x] Progressed/Changed HEP based on:   [x] positioning   [x] body mechanics   [x] transfers   [] heat/ice application    [] other:      Other Objective/Functional Measures: NA     Pain Level (0-10 scale) post treatment: 5.5/10    ASSESSMENT/Changes in Function:   Patient tolerated treatment session moderately, able to perform exercises and progressions. Patient noted continued irritation in lower back musculature during marching by table and with extensions at door, noted improvement with slight rest period and stretching. Patient was able to progress with strengthening during therex today.  Continue to progress as tolerated. Patient will continue to benefit from skilled PT services to modify and progress therapeutic interventions, address functional mobility deficits, address ROM deficits, address strength deficits, analyze and address soft tissue restrictions, analyze and cue movement patterns, analyze and modify body mechanics/ergonomics, and assess and modify postural abnormalities to attain remaining goals. [x]  See Plan of Care  []  See progress note/recertification  []  See Discharge Summary         Progress towards goals / Updated goals:  Short Term Goals: To be accomplished in 8-10 treatments:               Pt will be consistent and demonstrate I with HEP  Pt will complain of pain 3-4/10 with all activity  Pt will demonstrate improved ROM to complete work duties and all household chores more efficiently  Pt will be able to maintain positions for 45 min without increased symptoms   Long Term Goals:  To be accomplished in 20-24 treatments:               Pt will complain of pain 0-1/10 with all activity  Pt will increase strength to stabilize her core and maintain better posture with all activity  Pt will increase FOTO score by 7 points to meet MDC and demonstrate improved function with all activity  Pt will be able to implement a thorough gym routine to strengthen and stabilize her spine to perform all activity with decreased symptoms  Pt will use proper form and posture to complete all work tasks without increased symptoms 100% of the time    PLAN  [x]  Upgrade activities as tolerated     [x]  Continue plan of care  [x]  Update interventions per flow sheet       []  Discharge due to:_  []  Other:_      Kaley French, MELODY 2/8/2023

## 2023-02-13 ENCOUNTER — HOSPITAL ENCOUNTER (OUTPATIENT)
Dept: PHYSICAL THERAPY | Age: 38
Discharge: HOME | End: 2023-02-13
Payer: MEDICAID

## 2023-02-13 PROCEDURE — 97110 THERAPEUTIC EXERCISES: CPT

## 2023-02-13 NOTE — PROGRESS NOTES
PT DAILY TREATMENT NOTE 2-15    Patient Name: Johnathan Louie  Date:2023  : 1985  [x]  Patient  Verified  Payor: Laureenzofia Crisostomo / Plan: 1 [facility] 270 / Product Type: Managed Care Medicaid /    In time: 330 Out time: 416  Total Treatment Time (min): 55  Visit #:  4    Treatment Area: Fibromyalgia [M79.7]    SUBJECTIVE  Pain Level (0-10 scale): 3/10  Any medication changes, allergies to medications, adverse drug reactions, diagnosis change, or new procedure performed?: [x] No    [] Yes (see summary sheet for update)  Subjective functional status/changes:   [] No changes reported  Patient did note increased soreness and fatigue following previous session, noted increased symptoms and being flared up Wednesday night which lasted until they got their massage yesterday. Patient also noted they weren't sure if the weather and rain also may have played a part in their flare up. Patient noted they got massage yesterday, noted improvement following massage. OBJECTIVE    46 min Therapeutic Exercise:  [x] See flow sheet :   Rationale: increase ROM, increase strength, improve coordination, improve balance, and increase proprioception to improve the patients ability to perform daily activities. With   [x] TE   [] TA   [] Neuro   [] SC   [] other: Patient Education: [x] Review HEP    [x] Progressed/Changed HEP based on:   [x] positioning   [x] body mechanics   [x] transfers   [] heat/ice application    [] other:      Other Objective/Functional Measures: NA     Pain Level (0-10 scale) post treatment: 3/10    ASSESSMENT/Changes in Function:   Patient tolerated treatment session moderately, able to perform exercises and progressions without increasing pain symptoms post treatment session. Patient was able to progress with strengthening today, also noted decreased irritation during shoulder extensions.  Transitioned marches to supine position to avoid increasing pain symptoms compared to previous treatment session. Continue to progress as tolerated. Patient will continue to benefit from skilled PT services to modify and progress therapeutic interventions, address functional mobility deficits, address ROM deficits, address strength deficits, analyze and address soft tissue restrictions, analyze and cue movement patterns, analyze and modify body mechanics/ergonomics, and assess and modify postural abnormalities to attain remaining goals. [x]  See Plan of Care  []  See progress note/recertification  []  See Discharge Summary         Progress towards goals / Updated goals:  Short Term Goals: To be accomplished in 8-10 treatments:               Pt will be consistent and demonstrate I with HEP  Pt will complain of pain 3-4/10 with all activity  Pt will demonstrate improved ROM to complete work duties and all household chores more efficiently  Pt will be able to maintain positions for 45 min without increased symptoms   Long Term Goals:  To be accomplished in 20-24 treatments:               Pt will complain of pain 0-1/10 with all activity  Pt will increase strength to stabilize her core and maintain better posture with all activity  Pt will increase FOTO score by 7 points to meet MDC and demonstrate improved function with all activity  Pt will be able to implement a thorough gym routine to strengthen and stabilize her spine to perform all activity with decreased symptoms  Pt will use proper form and posture to complete all work tasks without increased symptoms 100% of the time    PLAN  [x]  Upgrade activities as tolerated     [x]  Continue plan of care  [x]  Update interventions per flow sheet       []  Discharge due to:_  []  Other:_      Italo Valdovinos, PTA 2/13/2023

## 2023-02-15 ENCOUNTER — HOSPITAL ENCOUNTER (OUTPATIENT)
Dept: PHYSICAL THERAPY | Age: 38
Discharge: HOME | End: 2023-02-15
Payer: MEDICAID

## 2023-02-15 PROCEDURE — 97110 THERAPEUTIC EXERCISES: CPT

## 2023-02-15 NOTE — PROGRESS NOTES
PT DAILY TREATMENT NOTE 2-15    Patient Name: Johnathan Louie  Date:2/15/2023  : 1985  [x]  Patient  Verified  Payor: Carlos A Kranthi / Plan: 1 [facility] 270 / Product Type: Managed Care Medicaid /    In time: 443 Out time: 340  Total Treatment Time (min): 39  Visit #:  5    Treatment Area: Fibromyalgia [M79.7]    SUBJECTIVE  Pain Level (0-10 scale): 2/10  Any medication changes, allergies to medications, adverse drug reactions, diagnosis change, or new procedure performed?: [x] No    [] Yes (see summary sheet for update)  Subjective functional status/changes:   [] No changes reported  Patient noted they felt pretty good following previous treatment session, noted they did have some increased soreness but nothing too major. Noted they feel as though their hip pain and \"clicking,\" has improved since initial treatment session as well. OBJECTIVE    39 min Therapeutic Exercise:  [x] See flow sheet :   Rationale: increase ROM, increase strength, improve coordination, improve balance, and increase proprioception to improve the patients ability to perform daily activities. With   [x] TE   [] TA   [] Neuro   [] SC   [] other: Patient Education: [x] Review HEP    [x] Progressed/Changed HEP based on:   [x] positioning   [x] body mechanics   [x] transfers   [] heat/ice application    [] other:      Other Objective/Functional Measures: NA     Pain Level (0-10 scale) post treatment: 2.5/10    ASSESSMENT/Changes in Function:   Patient tolerated treatment session well today, able to perform exercises and progressions. Patient noted improved LE strength, able to progress with strengthening during therex today. Patient did note instance of L LE giving out during march and pulldown at cable column, needed Mod A from therapist to maintain balance and avoid fall to L laterally. Continue to progress as tolerated.    Patient will continue to benefit from skilled PT services to modify and progress therapeutic interventions, address functional mobility deficits, address ROM deficits, address strength deficits, analyze and address soft tissue restrictions, analyze and cue movement patterns, analyze and modify body mechanics/ergonomics, and assess and modify postural abnormalities to attain remaining goals. [x]  See Plan of Care  []  See progress note/recertification  []  See Discharge Summary         Progress towards goals / Updated goals:  Short Term Goals: To be accomplished in 8-10 treatments:               Pt will be consistent and demonstrate I with HEP  Pt will complain of pain 3-4/10 with all activity  Pt will demonstrate improved ROM to complete work duties and all household chores more efficiently  Pt will be able to maintain positions for 45 min without increased symptoms   Long Term Goals:  To be accomplished in 20-24 treatments:               Pt will complain of pain 0-1/10 with all activity  Pt will increase strength to stabilize her core and maintain better posture with all activity  Pt will increase FOTO score by 7 points to meet MDC and demonstrate improved function with all activity  Pt will be able to implement a thorough gym routine to strengthen and stabilize her spine to perform all activity with decreased symptoms  Pt will use proper form and posture to complete all work tasks without increased symptoms 100% of the time    PLAN  [x]  Upgrade activities as tolerated     [x]  Continue plan of care  [x]  Update interventions per flow sheet       []  Discharge due to:_  []  Other:_      Amol Davies, PTA 2/15/2023

## 2023-02-20 ENCOUNTER — HOSPITAL ENCOUNTER (OUTPATIENT)
Dept: PHYSICAL THERAPY | Age: 38
Discharge: HOME | End: 2023-02-20
Payer: MEDICAID

## 2023-02-20 PROCEDURE — 97110 THERAPEUTIC EXERCISES: CPT

## 2023-02-20 NOTE — PROGRESS NOTES
PT DAILY TREATMENT NOTE 2-15    Patient Name: Aaron Heaton  Date:2023  : 1985  [x]  Patient  Verified  Payor: Gustavo Lora / Plan: 1 [facility] 270 / Product Type: Managed Care Medicaid /    In time: 843 Out time: 419  Total Treatment Time (min): 52  Visit #:  6    Treatment Area: Fibromyalgia [M79.7]    SUBJECTIVE  Pain Level (0-10 scale): 3/10  Any medication changes, allergies to medications, adverse drug reactions, diagnosis change, or new procedure performed?: [x] No    [] Yes (see summary sheet for update)  Subjective functional status/changes:   [] No changes reported  Patient noted they have been feeling good since previous treatment session, noted they have been able to perform exercises at home without any issues. Noted they did have some increased soreness following previous treatment session. OBJECTIVE    49 min Therapeutic Exercise:  [x] See flow sheet :   Rationale: increase ROM, increase strength, improve coordination, improve balance, and increase proprioception to improve the patients ability to perform daily activities. With   [x] TE   [] TA   [] Neuro   [] SC   [] other: Patient Education: [x] Review HEP    [x] Progressed/Changed HEP based on:   [x] positioning   [x] body mechanics   [x] transfers   [] heat/ice application    [] other:      Other Objective/Functional Measures: NA     Pain Level (0-10 scale) post treatment: 5/10 --> L LBP    ASSESSMENT/Changes in Function:   Patient tolerated treatment session moderately today, able to perform exercises and progressions. Patient noted improved LE strength, able to progress with strengthening during therex today. Patient did note rapid fatigue of lower back musculature and slight irritation during cable column core press today. Continue to progress as tolerated.    Patient will continue to benefit from skilled PT services to modify and progress therapeutic interventions, address functional mobility deficits, address ROM deficits, address strength deficits, analyze and address soft tissue restrictions, analyze and cue movement patterns, analyze and modify body mechanics/ergonomics, and assess and modify postural abnormalities to attain remaining goals. [x]  See Plan of Care  []  See progress note/recertification  []  See Discharge Summary         Progress towards goals / Updated goals:  Short Term Goals: To be accomplished in 8-10 treatments:               Pt will be consistent and demonstrate I with HEP  Pt will complain of pain 3-4/10 with all activity  Pt will demonstrate improved ROM to complete work duties and all household chores more efficiently  Pt will be able to maintain positions for 45 min without increased symptoms   Long Term Goals:  To be accomplished in 20-24 treatments:               Pt will complain of pain 0-1/10 with all activity  Pt will increase strength to stabilize her core and maintain better posture with all activity  Pt will increase FOTO score by 7 points to meet MDC and demonstrate improved function with all activity  Pt will be able to implement a thorough gym routine to strengthen and stabilize her spine to perform all activity with decreased symptoms  Pt will use proper form and posture to complete all work tasks without increased symptoms 100% of the time    PLAN  [x]  Upgrade activities as tolerated     [x]  Continue plan of care  [x]  Update interventions per flow sheet       []  Discharge due to:_  []  Other:_      Delvin Jaquez, PTA 2/20/2023

## 2023-02-22 ENCOUNTER — HOSPITAL ENCOUNTER (OUTPATIENT)
Dept: PHYSICAL THERAPY | Age: 38
Discharge: HOME | End: 2023-02-22
Payer: MEDICAID

## 2023-02-22 PROCEDURE — 97110 THERAPEUTIC EXERCISES: CPT

## 2023-02-22 NOTE — PROGRESS NOTES
PT DAILY TREATMENT NOTE 2-15    Patient Name: Aaron Heaton  Date:2023  : 1985  [x]  Patient  Verified  Payor: Gustavo Lora / Plan: 1 [facility] 270 / Product Type: Managed Care Medicaid /    In time: 300 Out time: 346  Total Treatment Time (min): 55  Visit #:  7    Treatment Area: Fibromyalgia [M79.7]    SUBJECTIVE  Pain Level (0-10 scale): 3/10  Any medication changes, allergies to medications, adverse drug reactions, diagnosis change, or new procedure performed?: [x] No    [] Yes (see summary sheet for update)  Subjective functional status/changes:   [] No changes reported  Patient noted they did have some increased soreness following previous treatment session. Noted they did experience some spasms in lower back and quadriceps musculature following previous treatment session. OBJECTIVE    49 min Therapeutic Exercise:  [x] See flow sheet :   Rationale: increase ROM, increase strength, improve coordination, improve balance, and increase proprioception to improve the patients ability to perform daily activities. With   [x] TE   [] TA   [] Neuro   [] SC   [] other: Patient Education: [x] Review HEP    [x] Progressed/Changed HEP based on:   [x] positioning   [x] body mechanics   [x] transfers   [] heat/ice application    [] other:      Other Objective/Functional Measures:    FOTO: 50/100    Lumbar AROM:                                                                                               R                      L                        Flexion                                      100%                         Extension                                 80% (p!)                        Side Bending   Fingertips to knee joint line       Fingertips to knee joint line               Rotation                                  100 %               100%                         LOWER QUARTER                            MUSCLE STRENGTH  KEY R                      L  0 - No Contraction                   L1, L2 Psoas               4+                     4+ (L LBP --> lateral hip p!)   1 - Trace                                  L3 Quads                    5                      4+  2 - Poor                                   L4 Tib Ant                    5                      5  3 - Fair                                     L5 EHL                        5                      5  4 - Good                                  S1 Peroneals              5                       5  5 - Normal                               S2 Hams                     4                      4                                       MMT:              HIP ER: R = 4 /L = 4               HIP Abd: R = 4 (p!)/L = 4 (p!)   Functional Tests:  30 Second Sit To Stand: 14.5     Pain Level (0-10 scale) post treatment: NA    ASSESSMENT/Changes in Function:   Patient is a 40year old being seen for fibromyalgia. Patient has been seen for 7 visits and has made improvements with lumbar AROM, functional strength tests, and lower quarter muscular strength during today's reassessment. Patient demonstrated improved lumbar during today's reassessment with flexion (100% v 75% previously), extension (80% v 75% previously). Patient also noted improved global lower extremity (see chart above). Patient also noted improvement with their functional strength testing during their 30 sec STS (14.5 repetitions v 7 repetitions previously). Patient has achieved 2/9 goals set for treatment with progress being made towards final goals for rehab.   Patient will continue to benefit from skilled PT services to modify and progress therapeutic interventions, address functional mobility deficits, address ROM deficits, address strength deficits, analyze and address soft tissue restrictions, analyze and cue movement patterns, analyze and modify body mechanics/ergonomics, and assess and modify postural abnormalities to attain remaining goals. [x]  See Plan of Care  []  See progress note/recertification  []  See Discharge Summary         Progress towards goals / Updated goals:  Short Term Goals: To be accomplished in 8-10 treatments:               Pt will be consistent and demonstrate I with HEP-MET  Pt will complain of pain 3-4/10 with all activity-progressing  Pt will demonstrate improved ROM to complete work duties and all household chores more efficiently- MET   Pt will be able to maintain positions for 45 min without increased symptoms   Long Term Goals:  To be accomplished in 20-24 treatments:               Pt will complain of pain 0-1/10 with all activity  Pt will increase strength to stabilize her core and maintain better posture with all activity  Pt will increase FOTO score by 7 points to meet MDC and demonstrate improved function with all activity  Pt will be able to implement a thorough gym routine to strengthen and stabilize her spine to perform all activity with decreased symptoms  Pt will use proper form and posture to complete all work tasks without increased symptoms 100% of the time    PLAN  [x]  Upgrade activities as tolerated     [x]  Continue plan of care  [x]  Update interventions per flow sheet       []  Discharge due to:_  []  Other:_      Leyda Mireles, PTA 2/22/2023

## 2023-02-23 NOTE — PROGRESS NOTES
[address] Physical Therapy  [address] [facility] [address] [place]  Phone: [phone] Fax: [phone]    Progress Note    Name: Lety Wright   : 1985   MD: Mitchell Shane MD       Treatment Diagnosis: Fibromyalgia [M79.7]  Start of Care: 2023    Visits from Start of Care: 7  Missed Visits: 1    Summary of Care:Patient is a 40year old being seen for fibromyalgia. Patient has been seen for 7 visits and has been treated using therapeutic exercises to make improvements with lumbar AROM, functional strength tests, and lower quarter muscular strength during today's reassessment. Assessment / Recommendations: Patient is a 40year old being seen for fibromyalgia. Patient has been seen for 7 visits and has made improvements with lumbar AROM, functional strength tests, and lower quarter muscular strength during today's reassessment. Patient demonstrated improved lumbar during today's reassessment with flexion (100% v 75% previously), extension (80% v 75% previously). Patient also noted improved global lower extremity (see chart above). Patient also noted improvement with their functional strength testing during their 30 sec STS (14.5 repetitions v 7 repetitions previously). Patient has achieved 2/9 goals set for treatment with progress being made towards final goals for rehab. Patient will continue to benefit from skilled PT services to modify and progress therapeutic interventions, address functional mobility deficits, address ROM deficits, address strength deficits, analyze and address soft tissue restrictions, analyze and cue movement patterns, analyze and modify body mechanics/ergonomics, and assess and modify postural abnormalities to attain remaining goals. Short Term Goals:  To be accomplished in 8-10 treatments:               Pt will be consistent and demonstrate I with HEP-MET  Pt will complain of pain 3-/10 with all activity-progressing  Pt will demonstrate improved ROM to complete work duties and all household chores more efficiently- MET   Pt will be able to maintain positions for 45 min without increased symptoms   Long Term Goals: To be accomplished in 20-24 treatments:               Pt will complain of pain 0-1/10 with all activity  Pt will increase strength to stabilize her core and maintain better posture with all activity  Pt will increase FOTO score by 7 points to meet MDC and demonstrate improved function with all activity  Pt will be able to implement a thorough gym routine to strengthen and stabilize her spine to perform all activity with decreased symptoms  Pt will use proper form and posture to complete all work tasks without increased symptoms 100% of the time    Other: Continue per POC to address remaining goals for treatment .       Aurora Ruiz, PTA 2/23/2023

## 2023-03-10 ENCOUNTER — HOSPITAL ENCOUNTER (OUTPATIENT)
Dept: PHYSICAL THERAPY | Age: 38
Discharge: HOME | End: 2023-03-10
Payer: MEDICAID

## 2023-03-10 PROCEDURE — 97110 THERAPEUTIC EXERCISES: CPT

## 2023-03-10 NOTE — PROGRESS NOTES
PT DAILY TREATMENT NOTE 2-15    Patient Name: Sloane Durbin  Date:3/10/2023  : 1985  [x]  Patient  Verified  Payor: Geoffrey Maya / Plan: 1 [facility] 270 / Product Type: Managed Care Medicaid /    In time: 2951 Out time: 0190  Total Treatment Time (min): 52  Visit #:  8    Treatment Area: Fibromyalgia [M79.7]    SUBJECTIVE  Pain Level (0-10 scale): 0/10  Any medication changes, allergies to medications, adverse drug reactions, diagnosis change, or new procedure performed?: [x] No    [] Yes (see summary sheet for update)  Subjective functional status/changes:   [] No changes reported  Patient noted they have been able to keep up with their exercises at home and have been feeling pretty good. Noted they have only had one flare up about 4 days ago since previous treatment session, but noted it was very mild. Also noted they have gotten some weights at home and have been doing more UE lifting as well. OBJECTIVE    52 min Therapeutic Exercise:  [x] See flow sheet :   Rationale: increase ROM, increase strength, improve coordination, improve balance, and increase proprioception to improve the patients ability to perform daily activities. With   [x] TE   [] TA   [] Neuro   [] SC   [] other: Patient Education: [x] Review HEP    [] Progressed/Changed HEP based on:   [] positioning   [] body mechanics   [] transfers   [] heat/ice application    [] other:      Other Objective/Functional Measures:     Pain Level (0-10 scale) post treatment: 0/10    ASSESSMENT/Changes in Function:   Patient tolerated treatment session well today, able to perform exercises and progressions without increasing pain symptoms post treatment session. Patient also noted R>L glute strength, noted increase in L hip pain during repetitions. Patient was able to progress with strengthening during therex today. Continue to progress as tolerated.    Patient will continue to benefit from skilled PT services to modify and progress therapeutic interventions, address functional mobility deficits, address ROM deficits, address strength deficits, analyze and address soft tissue restrictions, analyze and cue movement patterns, analyze and modify body mechanics/ergonomics, and assess and modify postural abnormalities to attain remaining goals. [x]  See Plan of Care  []  See progress note/recertification  []  See Discharge Summary         Progress towards goals / Updated goals:  Short Term Goals: To be accomplished in 8-10 treatments:               Pt will be consistent and demonstrate I with HEP-MET  Pt will complain of pain 3-4/10 with all activity-progressing  Pt will demonstrate improved ROM to complete work duties and all household chores more efficiently- MET   Pt will be able to maintain positions for 45 min without increased symptoms   Long Term Goals:  To be accomplished in 20-24 treatments:               Pt will complain of pain 0-1/10 with all activity  Pt will increase strength to stabilize her core and maintain better posture with all activity  Pt will increase FOTO score by 7 points to meet MDC and demonstrate improved function with all activity  Pt will be able to implement a thorough gym routine to strengthen and stabilize her spine to perform all activity with decreased symptoms  Pt will use proper form and posture to complete all work tasks without increased symptoms 100% of the time    PLAN  [x]  Upgrade activities as tolerated     [x]  Continue plan of care  [x]  Update interventions per flow sheet       []  Discharge due to:_  []  Other:_      Lexi Rios, PTA 3/10/2023

## 2023-03-17 ENCOUNTER — HOSPITAL ENCOUNTER (OUTPATIENT)
Dept: PHYSICAL THERAPY | Age: 38
Discharge: HOME | End: 2023-03-17
Payer: MEDICAID

## 2023-03-17 PROCEDURE — 97110 THERAPEUTIC EXERCISES: CPT

## 2023-03-17 NOTE — PROGRESS NOTES
PT DAILY TREATMENT NOTE 2-15    Patient Name: Mathieu Carlin  Date:3/17/2023  : 1985  [x]  Patient  Verified  Payor: Holly Luis / Plan: 1 [place] [id number] / Product Type: Managed Care Medicaid /    In time: 92 Out time:   Total Treatment Time (min): 52  Visit #:  9    Treatment Area: Fibromyalgia [M79.7]    SUBJECTIVE  Pain Level (0-10 scale): 5/10  Any medication changes, allergies to medications, adverse drug reactions, diagnosis change, or new procedure performed?: [x] No    [] Yes (see summary sheet for update)  Subjective functional status/changes:   [] No changes reported  Patient noted they had attempted to gain workplace accommodations and were denied by their PCP, noted they were attempting to get just an extra period for rest during their day so they could take a break but were denied. Noted they did have one day where they had a flare up but other than that have been doing well. OBJECTIVE    52 min Therapeutic Exercise:  [x] See flow sheet :   Rationale: increase ROM, increase strength, improve coordination, improve balance, and increase proprioception to improve the patients ability to perform daily activities. With   [x] TE   [] TA   [] Neuro   [] SC   [] other: Patient Education: [x] Review HEP    [] Progressed/Changed HEP based on:   [] positioning   [] body mechanics   [] transfers   [] heat/ice application    [] other:      Other Objective/Functional Measures:     Pain Level (0-10 scale) post treatment: 7/10    ASSESSMENT/Changes in Function:   Patient tolerated treatment session moderately today, able to perform exercises and progressions today. Patient did note slight increase in lower back following treatment session. Patient has noted their lower back continues to be the most irritated spot, core stability exercises were added today to strengthen moving forward. Continue to progress as tolerated.    Patient will continue to benefit from skilled PT services to modify and progress therapeutic interventions, address functional mobility deficits, address ROM deficits, address strength deficits, analyze and address soft tissue restrictions, analyze and cue movement patterns, analyze and modify body mechanics/ergonomics, and assess and modify postural abnormalities to attain remaining goals. [x]  See Plan of Care  []  See progress note/recertification  []  See Discharge Summary         Progress towards goals / Updated goals:  Short Term Goals: To be accomplished in 8-10 treatments:               Pt will be consistent and demonstrate I with HEP-MET  Pt will complain of pain 3-4/10 with all activity-progressing  Pt will demonstrate improved ROM to complete work duties and all household chores more efficiently- MET   Pt will be able to maintain positions for 45 min without increased symptoms   Long Term Goals:  To be accomplished in 20-24 treatments:               Pt will complain of pain 0-1/10 with all activity  Pt will increase strength to stabilize her core and maintain better posture with all activity  Pt will increase FOTO score by 7 points to meet MDC and demonstrate improved function with all activity  Pt will be able to implement a thorough gym routine to strengthen and stabilize her spine to perform all activity with decreased symptoms  Pt will use proper form and posture to complete all work tasks without increased symptoms 100% of the time    PLAN  [x]  Upgrade activities as tolerated     [x]  Continue plan of care  [x]  Update interventions per flow sheet       []  Discharge due to:_  []  Other:_      Christpoher Young, PTA 3/17/2023

## 2023-04-26 ENCOUNTER — HOSPITAL ENCOUNTER (EMERGENCY)
Age: 38
Discharge: LEFT BEFORE DISCHARGE | End: 2023-04-26
Attending: EMERGENCY MEDICINE
Payer: MEDICAID

## 2023-04-26 VITALS
DIASTOLIC BLOOD PRESSURE: 73 MMHG | RESPIRATION RATE: 18 BRPM | TEMPERATURE: 98.1 F | HEIGHT: 60 IN | OXYGEN SATURATION: 96 % | BODY MASS INDEX: 50.03 KG/M2 | WEIGHT: 254.85 LBS | HEART RATE: 76 BPM | SYSTOLIC BLOOD PRESSURE: 115 MMHG

## 2023-04-26 PROCEDURE — 75810000275 HC EMERGENCY DEPT VISIT NO LEVEL OF CARE

## 2023-04-26 NOTE — ED TRIAGE NOTES
Pt arrives to ed w reports of R ear pain/pressure, sensation something is in ear onset 2 wks ago. Saline flush w/o relief.

## 2023-04-28 ENCOUNTER — TRANSCRIBE ORDER (OUTPATIENT)
Dept: SCHEDULING | Age: 38
End: 2023-04-28

## 2023-04-28 DIAGNOSIS — T71.9XXA: Primary | ICD-10-CM

## 2023-07-05 ENCOUNTER — TELEPHONE (OUTPATIENT)
Age: 38
End: 2023-07-05

## 2023-07-05 ENCOUNTER — OFFICE VISIT (OUTPATIENT)
Age: 38
End: 2023-07-05
Payer: MEDICAID

## 2023-07-05 VITALS
BODY MASS INDEX: 49.87 KG/M2 | SYSTOLIC BLOOD PRESSURE: 129 MMHG | OXYGEN SATURATION: 97 % | DIASTOLIC BLOOD PRESSURE: 72 MMHG | HEART RATE: 72 BPM | RESPIRATION RATE: 17 BRPM | HEIGHT: 60 IN | WEIGHT: 254 LBS

## 2023-07-05 DIAGNOSIS — G63 POLYNEUROPATHY IN DISEASES CLASSIFIED ELSEWHERE (HCC): ICD-10-CM

## 2023-07-05 DIAGNOSIS — R41.840 ATTENTION AND CONCENTRATION DEFICIT: Primary | ICD-10-CM

## 2023-07-05 DIAGNOSIS — R26.89 DIFFICULTY BALANCING WHEN STANDING: ICD-10-CM

## 2023-07-05 PROCEDURE — 99215 OFFICE O/P EST HI 40 MIN: CPT

## 2023-07-05 ASSESSMENT — PATIENT HEALTH QUESTIONNAIRE - PHQ9
SUM OF ALL RESPONSES TO PHQ QUESTIONS 1-9: 0
2. FEELING DOWN, DEPRESSED OR HOPELESS: 0
1. LITTLE INTEREST OR PLEASURE IN DOING THINGS: 0
SUM OF ALL RESPONSES TO PHQ9 QUESTIONS 1 & 2: 0
SUM OF ALL RESPONSES TO PHQ QUESTIONS 1-9: 0
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
## 2023-07-05 NOTE — TELEPHONE ENCOUNTER
Patient states she needs a call back to discuss getting Accomodation for her Employer due to having Difficulty Functioning with her Condition. Please call to discuss & advise if appt required.  Thank you      Patient last seen 1/10/23

## 2023-07-05 NOTE — PROGRESS NOTES
Chief Complaint   Patient presents with    Follow-up     Left great toe constantly numb and painful.   Difficulty with balance and walking      Vitals:    07/05/23 0902   BP: 129/72   Pulse: 72   Resp: 17   SpO2: 97%

## 2023-07-06 NOTE — TELEPHONE ENCOUNTER
Called, Spoke with Pt  Received two pt identifiers  Pt states has been having complications due to Fibromyalgia inhibiting her at times at work  Pt states some days she feels like she has been hit by a bus  Pt states her work is willing to work with her  Advised pt to obtain a leave and accomodation form and to drop it off  Advised pt will send message to Dr. Hardy Marie and will give form when dropped off to see what Dr. Hardy Marie can fill out  Advised pt she may need an additional appt so it can be filled out  Pt verbalizes understanding of the instructions and has no further questions at this time.

## 2023-07-10 ASSESSMENT — ENCOUNTER SYMPTOMS: SHORTNESS OF BREATH: 0

## 2023-07-19 ENCOUNTER — TELEPHONE (OUTPATIENT)
Age: 38
End: 2023-07-19

## 2023-07-20 ENCOUNTER — TELEPHONE (OUTPATIENT)
Age: 38
End: 2023-07-20

## 2023-07-20 DIAGNOSIS — R41.840 ATTENTION AND CONCENTRATION DEFICIT: Primary | ICD-10-CM

## 2023-07-20 DIAGNOSIS — R41.3 MEMORY LOSS: ICD-10-CM

## 2023-07-20 NOTE — TELEPHONE ENCOUNTER
I have placed an order for a MRI brain. I am unsure if her insurance with pay for it given the reasoning, but she can try.

## 2023-07-20 NOTE — TELEPHONE ENCOUNTER
Call placed to patient. 2 identifiers received. Patient was seen by you on 7/5/23. Patient is reporting symptoms of continued brain fog and memory loss. She would like an MRI ordered. PCP ordered one but she had to cancel because they no longer take her insurance and needed a PA. Has not seen Dr. Jeanne Hong yet. Please advise. Thanks.

## 2023-07-21 ENCOUNTER — TELEMEDICINE (OUTPATIENT)
Age: 38
End: 2023-07-21
Payer: MEDICAID

## 2023-07-21 DIAGNOSIS — E55.9 VITAMIN D DEFICIENCY: ICD-10-CM

## 2023-07-21 DIAGNOSIS — F41.9 ANXIETY AND DEPRESSION: ICD-10-CM

## 2023-07-21 DIAGNOSIS — F32.A ANXIETY AND DEPRESSION: ICD-10-CM

## 2023-07-21 DIAGNOSIS — M79.7 FIBROMYALGIA: ICD-10-CM

## 2023-07-21 DIAGNOSIS — R73.01 IFG (IMPAIRED FASTING GLUCOSE): ICD-10-CM

## 2023-07-21 DIAGNOSIS — E66.01 MORBID OBESITY WITH BODY MASS INDEX (BMI) OF 40.0 TO 49.9 (HCC): Primary | ICD-10-CM

## 2023-07-21 PROCEDURE — 99214 OFFICE O/P EST MOD 30 MIN: CPT | Performed by: INTERNAL MEDICINE

## 2023-07-21 SDOH — ECONOMIC STABILITY: HOUSING INSECURITY
IN THE LAST 12 MONTHS, WAS THERE A TIME WHEN YOU DID NOT HAVE A STEADY PLACE TO SLEEP OR SLEPT IN A SHELTER (INCLUDING NOW)?: NO

## 2023-07-21 SDOH — ECONOMIC STABILITY: INCOME INSECURITY: HOW HARD IS IT FOR YOU TO PAY FOR THE VERY BASICS LIKE FOOD, HOUSING, MEDICAL CARE, AND HEATING?: NOT HARD AT ALL

## 2023-07-21 SDOH — ECONOMIC STABILITY: FOOD INSECURITY: WITHIN THE PAST 12 MONTHS, THE FOOD YOU BOUGHT JUST DIDN'T LAST AND YOU DIDN'T HAVE MONEY TO GET MORE.: NEVER TRUE

## 2023-07-21 SDOH — ECONOMIC STABILITY: FOOD INSECURITY: WITHIN THE PAST 12 MONTHS, YOU WORRIED THAT YOUR FOOD WOULD RUN OUT BEFORE YOU GOT MONEY TO BUY MORE.: NEVER TRUE

## 2023-07-21 ASSESSMENT — PATIENT HEALTH QUESTIONNAIRE - PHQ9
10. IF YOU CHECKED OFF ANY PROBLEMS, HOW DIFFICULT HAVE THESE PROBLEMS MADE IT FOR YOU TO DO YOUR WORK, TAKE CARE OF THINGS AT HOME, OR GET ALONG WITH OTHER PEOPLE: 0
2. FEELING DOWN, DEPRESSED OR HOPELESS: 2
7. TROUBLE CONCENTRATING ON THINGS, SUCH AS READING THE NEWSPAPER OR WATCHING TELEVISION: 0
SUM OF ALL RESPONSES TO PHQ QUESTIONS 1-9: 9
SUM OF ALL RESPONSES TO PHQ9 QUESTIONS 1 & 2: 4
4. FEELING TIRED OR HAVING LITTLE ENERGY: 2
SUM OF ALL RESPONSES TO PHQ QUESTIONS 1-9: 9
5. POOR APPETITE OR OVEREATING: 1
8. MOVING OR SPEAKING SO SLOWLY THAT OTHER PEOPLE COULD HAVE NOTICED. OR THE OPPOSITE, BEING SO FIGETY OR RESTLESS THAT YOU HAVE BEEN MOVING AROUND A LOT MORE THAN USUAL: 0
1. LITTLE INTEREST OR PLEASURE IN DOING THINGS: 2
SUM OF ALL RESPONSES TO PHQ QUESTIONS 1-9: 9
6. FEELING BAD ABOUT YOURSELF - OR THAT YOU ARE A FAILURE OR HAVE LET YOURSELF OR YOUR FAMILY DOWN: 0
9. THOUGHTS THAT YOU WOULD BE BETTER OFF DEAD, OR OF HURTING YOURSELF: 0
3. TROUBLE FALLING OR STAYING ASLEEP: 2
SUM OF ALL RESPONSES TO PHQ QUESTIONS 1-9: 9

## 2023-07-21 NOTE — PROGRESS NOTES
1. \"Have you been to the ER, urgent care clinic since your last visit? Hospitalized since your last visit? \" Yes [facility] for Brain Fog    2. \"Have you seen or consulted any other health care providers outside of the [address] [place] [address] since your last visit? \" Yes [facility]      3. For patients aged 43-73: Has the patient had a colonoscopy / FIT/ Cologuard? NA - based on age      If the patient is female:    4. For patients aged 43-66: Has the patient had a mammogram within the past 2 years? NA - based on age or sex      11. For patients aged 21-65: Has the patient had a pap smear? Yes - Care Gap present.  Most recent result on file

## 2024-02-05 NOTE — PROGRESS NOTES
HISTORY OF PRESENT ILLNESS  Eb Pinto is a 38 y.o. female.  HPI    Pt was last here vv 7/21/23. Here for routine care.      She reports R ear fullness and pressure x 1-2 weeks with radiation to the back of her neck   States she was sick 2/8/24 with a fever 103 - this has resolved, had a negative covid test at that time   Normal on exam  Advised flonase     BP today is 105/70    Wt today is 237 lbs,  down 17 lbs since lov   Discussed diet and wt/l  She previously was taking adipex and naltrexone through nutritionist at gyn, no longer going      Ordered labs  Reminded pt to complete labs      She saw Dr. Castle (neuro) 11/29/21 for neuropathy symptoms  She saw LINDA Matias (neuro) 7/5/23 - she will f/u prn   She is no longer taking gabapentin not interested makes her feel foggy    Neuropsych memory testing scheduled 6/24        Previously following with Dr. Wilson (rheum) for fibromyalgia   Last there 3/19/20,  F/u was scheduled 7/23 however she did not go  Previously was on cymbalta 30 mg for fibromyalgia pain, this caused brain fog  Previously was on gabapentin, not currently due to fogginess          She saw Dr Pop (ortho) 12/23 for lower back pain  Reviewed note:  Eb Pinto presents today for follow-up of her hip. She reports that her left hip pain itself is actually improved since her arthrogram which is a good thing. She continues to have low back pain with radiation down the left lower extremity and signs of chronic sciatica. Based on this, I do think that we should proceed with her left L4-L5 cortisone injection which was previously ordered. I have asked her to contact us to let us know if she has not heard to get that scheduled. It is my hope that she can get some significant symptom relief from this cortisone injection, especially for her radicular type symptoms. If not, I think that it may be prudent to have her evaluated by one of my partners in the spine surgery division. I have asked her to

## 2024-02-13 ENCOUNTER — OFFICE VISIT (OUTPATIENT)
Age: 39
End: 2024-02-13
Payer: MEDICAID

## 2024-02-13 VITALS
SYSTOLIC BLOOD PRESSURE: 105 MMHG | DIASTOLIC BLOOD PRESSURE: 70 MMHG | BODY MASS INDEX: 46.53 KG/M2 | OXYGEN SATURATION: 98 % | HEIGHT: 60 IN | RESPIRATION RATE: 20 BRPM | TEMPERATURE: 98.4 F | WEIGHT: 237 LBS | HEART RATE: 98 BPM

## 2024-02-13 DIAGNOSIS — E66.01 MORBID OBESITY WITH BODY MASS INDEX (BMI) OF 40.0 TO 49.9 (HCC): ICD-10-CM

## 2024-02-13 DIAGNOSIS — F41.9 ANXIETY AND DEPRESSION: ICD-10-CM

## 2024-02-13 DIAGNOSIS — F32.A ANXIETY AND DEPRESSION: ICD-10-CM

## 2024-02-13 DIAGNOSIS — Z00.00 PHYSICAL EXAM: Primary | ICD-10-CM

## 2024-02-13 DIAGNOSIS — J01.00 ACUTE NON-RECURRENT MAXILLARY SINUSITIS: ICD-10-CM

## 2024-02-13 DIAGNOSIS — N28.1 RENAL CYST: ICD-10-CM

## 2024-02-13 PROCEDURE — 99395 PREV VISIT EST AGE 18-39: CPT | Performed by: INTERNAL MEDICINE

## 2024-02-13 RX ORDER — DESVENLAFAXINE 25 MG/1
25 TABLET, EXTENDED RELEASE ORAL DAILY
COMMUNITY

## 2024-02-13 NOTE — PROGRESS NOTES
\"Have you been to the ER, urgent care clinic since your last visit?  Hospitalized since your last visit?\"    NO    “Have you seen or consulted any other health care providers outside of [facility] since your last visit?”    NO

## 2024-02-14 LAB
ALBUMIN SERPL-MCNC: 3.5 G/DL (ref 3.5–5)
ALBUMIN/GLOB SERPL: 0.9 (ref 1.1–2.2)
ALP SERPL-CCNC: 64 U/L (ref 45–117)
ALT SERPL-CCNC: 17 U/L (ref 12–78)
ANION GAP SERPL CALC-SCNC: 5 MMOL/L (ref 5–15)
AST SERPL-CCNC: 15 U/L (ref 15–37)
BILIRUB SERPL-MCNC: 0.3 MG/DL (ref 0.2–1)
BUN SERPL-MCNC: 9 MG/DL (ref 6–20)
BUN/CREAT SERPL: 10 (ref 12–20)
CALCIUM SERPL-MCNC: 8.6 MG/DL (ref 8.5–10.1)
CHLORIDE SERPL-SCNC: 106 MMOL/L (ref 97–108)
CHOLEST SERPL-MCNC: 193 MG/DL
CO2 SERPL-SCNC: 27 MMOL/L (ref 21–32)
CREAT SERPL-MCNC: 0.9 MG/DL (ref 0.55–1.02)
ERYTHROCYTE [DISTWIDTH] IN BLOOD BY AUTOMATED COUNT: 12.8 % (ref 11.5–14.5)
EST. AVERAGE GLUCOSE BLD GHB EST-MCNC: 108 MG/DL
GLOBULIN SER CALC-MCNC: 3.8 G/DL (ref 2–4)
GLUCOSE SERPL-MCNC: 84 MG/DL (ref 65–100)
HBA1C MFR BLD: 5.4 % (ref 4–5.6)
HCT VFR BLD AUTO: 43.9 % (ref 35–47)
HDLC SERPL-MCNC: 45 MG/DL
HDLC SERPL: 4.3 (ref 0–5)
HGB BLD-MCNC: 13.7 G/DL (ref 11.5–16)
LDLC SERPL CALC-MCNC: 106.8 MG/DL (ref 0–100)
MCH RBC QN AUTO: 27.8 PG (ref 26–34)
MCHC RBC AUTO-ENTMCNC: 31.2 G/DL (ref 30–36.5)
MCV RBC AUTO: 89 FL (ref 80–99)
NRBC # BLD: 0 K/UL (ref 0–0.01)
NRBC BLD-RTO: 0 PER 100 WBC
PLATELET # BLD AUTO: 323 K/UL (ref 150–400)
PMV BLD AUTO: 9.3 FL (ref 8.9–12.9)
POTASSIUM SERPL-SCNC: 4.4 MMOL/L (ref 3.5–5.1)
PROT SERPL-MCNC: 7.3 G/DL (ref 6.4–8.2)
RBC # BLD AUTO: 4.93 M/UL (ref 3.8–5.2)
SODIUM SERPL-SCNC: 138 MMOL/L (ref 136–145)
TRIGL SERPL-MCNC: 206 MG/DL
TSH SERPL DL<=0.05 MIU/L-ACNC: 1.12 UIU/ML (ref 0.36–3.74)
VLDLC SERPL CALC-MCNC: 41.2 MG/DL
WBC # BLD AUTO: 7.3 K/UL (ref 3.6–11)

## 2024-05-13 ENCOUNTER — TELEPHONE (OUTPATIENT)
Age: 39
End: 2024-05-13

## 2024-05-13 NOTE — TELEPHONE ENCOUNTER
Called patient to inform appt scheduled with Dr Pastor has been cancelled and to offer an appt with Dr Mares. LM requesting a call back to reschedule.

## 2024-09-30 ENCOUNTER — TELEMEDICINE (OUTPATIENT)
Age: 39
End: 2024-09-30
Payer: COMMERCIAL

## 2024-09-30 DIAGNOSIS — Z86.59 HISTORY OF PSYCHIATRIC HOSPITALIZATION: ICD-10-CM

## 2024-09-30 DIAGNOSIS — R41.840 ATTENTION AND CONCENTRATION DEFICIT: ICD-10-CM

## 2024-09-30 DIAGNOSIS — R45.89 SYMPTOMS OF DEPRESSION: ICD-10-CM

## 2024-09-30 DIAGNOSIS — Z91.49 HISTORY OF PSYCHOLOGICAL TRAUMA: ICD-10-CM

## 2024-09-30 DIAGNOSIS — R47.89 WORD FINDING DIFFICULTY: ICD-10-CM

## 2024-09-30 DIAGNOSIS — R41.3 MEMORY LOSS: Primary | ICD-10-CM

## 2024-09-30 DIAGNOSIS — F41.9 ANXIETY: ICD-10-CM

## 2024-09-30 DIAGNOSIS — Z62.9 HISTORY OF ADVERSE CHILDHOOD EXPERIENCES: ICD-10-CM

## 2024-09-30 PROCEDURE — 90791 PSYCH DIAGNOSTIC EVALUATION: CPT | Performed by: PSYCHOLOGIST

## 2024-09-30 SDOH — HEALTH STABILITY - MENTAL HEALTH: PROBLEM RELATED TO UPBRINGING, UNSPECIFIED: Z62.9

## 2024-09-30 NOTE — PROGRESS NOTES
patient. Determine test protocol: 60 minutes. Total 1 unit      Hussain Mares, PhD  Licensed Clinical Psychologist    This note was created using voice recognition software. Despite editing, there may be syntax errors.         Eb Pinto, was evaluated through a synchronous (real-time) audio-video encounter. The patient (or guardian if applicable) is aware that this is a billable service, which includes applicable co-pays. This Virtual Visit was conducted with patient's (and/or legal guardian's) consent. Patient identification was verified, and a caregiver was present when appropriate.   The patient was located at Home: [place], [place]; unaccompanied; at work  Provider was located at Home  Confirm you are appropriately licensed, registered, or certified to deliver care in the state where the patient is located as indicated above. If you are not or unsure, please re-schedule the visit: Yes, I confirm.      Total time spent for this encounter:  30 minutes    --Hussain Mares, PhD on 9/30/2024 at 7:12 AM    An electronic signature was used to authenticate this note.

## 2024-11-19 ENCOUNTER — PROCEDURE VISIT (OUTPATIENT)
Age: 39
End: 2024-11-19
Payer: COMMERCIAL

## 2024-11-19 DIAGNOSIS — F43.10 PTSD (POST-TRAUMATIC STRESS DISORDER): ICD-10-CM

## 2024-11-19 DIAGNOSIS — Z65.8 PSYCHOSOCIAL DISTRESS: ICD-10-CM

## 2024-11-19 DIAGNOSIS — Z65.8 INADEQUATE SOCIAL SUPPORT: ICD-10-CM

## 2024-11-19 DIAGNOSIS — R45.851 PASSIVE SUICIDAL IDEATIONS: ICD-10-CM

## 2024-11-19 DIAGNOSIS — F90.2 ATTENTION DEFICIT HYPERACTIVITY DISORDER (ADHD), COMBINED TYPE: Primary | ICD-10-CM

## 2024-11-19 DIAGNOSIS — Z86.59 HISTORY OF PSYCHIATRIC HOSPITALIZATION: ICD-10-CM

## 2024-11-19 DIAGNOSIS — Z62.9 HISTORY OF ADVERSE CHILDHOOD EXPERIENCES: ICD-10-CM

## 2024-11-19 DIAGNOSIS — F33.2 SEVERE EPISODE OF RECURRENT MAJOR DEPRESSIVE DISORDER, WITHOUT PSYCHOTIC FEATURES (HCC): ICD-10-CM

## 2024-11-19 DIAGNOSIS — F41.8 ANXIETY WITH SOMATIC FEATURES: ICD-10-CM

## 2024-11-19 PROCEDURE — 96136 PSYCL/NRPSYC TST PHY/QHP 1ST: CPT | Performed by: PSYCHOLOGIST

## 2024-11-19 PROCEDURE — 96133 NRPSYC TST EVAL PHYS/QHP EA: CPT | Performed by: PSYCHOLOGIST

## 2024-11-19 PROCEDURE — 96132 NRPSYC TST EVAL PHYS/QHP 1ST: CPT | Performed by: PSYCHOLOGIST

## 2024-11-19 PROCEDURE — 96139 PSYCL/NRPSYC TST TECH EA: CPT | Performed by: PSYCHOLOGIST

## 2024-11-19 PROCEDURE — 96138 PSYCL/NRPSYC TECH 1ST: CPT | Performed by: PSYCHOLOGIST

## 2024-11-19 SDOH — HEALTH STABILITY - MENTAL HEALTH: PROBLEM RELATED TO UPBRINGING, UNSPECIFIED: Z62.9

## 2024-11-19 NOTE — PROGRESS NOTES
[facility]  [address] [facility], [facility], [address] [address]  Office:  [phone]  Fax: [phone]                  Neuropsychological Evaluation Report    Patient Name: Eb Pinto  Age: 39 y.o.  Gender: female   Handedness: right handed   Presenting Concern: cognitive decline (memory and attention); anxiety and depression; history of childhood trauma  Primary Care Physician: Kathy Zurita MD  Referring Provider: Bandar Matias APRN-NP      PATIENT HISTORY (OBTAINED DURING INITIAL CLINICAL EVALUATION):    REASON FOR REFERRAL:  This comprehensive and medically necessary neuropsychological assessment was requested to assist a differential diagnosis of cognitive and psychological concerns.  The use and purpose of this examination, as well as the extent and limitations of confidentiality, were explained prior to obtaining permission to participate.  Instructions were provided regarding the necessity to put forth optimal effort and answer questions truthfully in order to obtain reliable and accurate test results.    REVIEW OF RECORDS:  Ms. Pinto was referred by neurology where she is followed for large fiber peripheral neuropathy.  A history of fibromyalgia is noted.  A neuropsychological evaluation has been requested due to memory loss and difficulties with attention and concentration (behavioral observations at a recent neurology visit suggest flight of ideas, losing thought mid sentence, emotional difficulties).  Ms. Pinto is also followed by psychiatry for anxiety and depression. A history of childhood trauma is noted. Pristiq is prescribed.    There are several orders for an MRI of the brain in records but I do not see any actual imagining studies that were completed.     Current Outpatient Medications   Medication Sig Dispense Refill    desvenlafaxine succinate (PRISTIQ) 25 MG TB24 extended release tablet Take 1 tablet by mouth daily       No current facility-administered

## 2024-12-03 ENCOUNTER — TELEMEDICINE (OUTPATIENT)
Age: 39
End: 2024-12-03
Payer: MEDICAID

## 2024-12-03 DIAGNOSIS — F33.2 SEVERE EPISODE OF RECURRENT MAJOR DEPRESSIVE DISORDER, WITHOUT PSYCHOTIC FEATURES (HCC): ICD-10-CM

## 2024-12-03 DIAGNOSIS — Z62.9 HISTORY OF ADVERSE CHILDHOOD EXPERIENCES: ICD-10-CM

## 2024-12-03 DIAGNOSIS — Z65.8 PSYCHOSOCIAL DISTRESS: ICD-10-CM

## 2024-12-03 DIAGNOSIS — Z86.59 HISTORY OF PSYCHIATRIC HOSPITALIZATION: ICD-10-CM

## 2024-12-03 DIAGNOSIS — F90.2 ATTENTION DEFICIT HYPERACTIVITY DISORDER (ADHD), COMBINED TYPE: Primary | ICD-10-CM

## 2024-12-03 DIAGNOSIS — Z65.8 INADEQUATE SOCIAL SUPPORT: ICD-10-CM

## 2024-12-03 DIAGNOSIS — R45.851 PASSIVE SUICIDAL IDEATIONS: ICD-10-CM

## 2024-12-03 DIAGNOSIS — F43.10 PTSD (POST-TRAUMATIC STRESS DISORDER): ICD-10-CM

## 2024-12-03 DIAGNOSIS — F41.8 ANXIETY WITH SOMATIC FEATURES: ICD-10-CM

## 2024-12-03 PROCEDURE — 90832 PSYTX W PT 30 MINUTES: CPT | Performed by: PSYCHOLOGIST

## 2024-12-03 SDOH — HEALTH STABILITY - MENTAL HEALTH: PROBLEM RELATED TO UPBRINGING, UNSPECIFIED: Z62.9

## 2024-12-03 NOTE — PROGRESS NOTES
Interactive Psychotherapy/office feedback        Interactive office feedback session with Ms. Pinto.  I reviewed the results of the recent Neuropsychological Evaluation  including the observed areas of neurocognitive strengths and weaknesses. Education was provided regarding my diagnostic impressions, and treatment plan/options were discussed. I also answered numerous questions related to the clinical findings, including the various methods to improve cognition and mood. CBT, psychoeducation, and supportive psychotherapy techniques were utilized.     Patient's report placed in [facility] per patient request.         Prior to seeing the patient I reviewed the records, including the previously completed report, the records in [facility], and any updated visits from other providers since I saw the patient last.       Diagnoses:      ADHD, combined type  R/O Learning Disability   Major Depressive Disorder, severe, without psychotic features  Passive Suicidal Ideations  PTSD  Anxiety with somatic features  Psychosocial Distress  Inadequate Social Support  H/O Panic Attacks  History of Psychiatric Hospitalization  History of Adverse Childhood Experiences    The patient will follow up with the referring provider, and reported being very pleased with the services provided.  Follow up with [facility] prn.         [address]82 psychotherapy 30 Minutes      This note was created using voice recognition software. Despite editing, there may be syntax errors.         Eb Pinto, was evaluated through a synchronous (real-time) audio-video encounter. The patient (or guardian if applicable) is aware that this is a billable service, which includes applicable co-pays. This Virtual Visit was conducted with patient's (and/or legal guardian's) consent. Patient identification was verified, and a caregiver was present when appropriate.   The patient was located at Home: [employer] [address]; [facility]; [place]   Provider was located at Home

## 2024-12-29 ENCOUNTER — APPOINTMENT (OUTPATIENT)
Facility: HOSPITAL | Age: 39
End: 2024-12-29
Payer: MEDICAID

## 2024-12-29 ENCOUNTER — HOSPITAL ENCOUNTER (EMERGENCY)
Facility: HOSPITAL | Age: 39
Discharge: HOME | End: 2024-12-29
Payer: MEDICAID

## 2024-12-29 VITALS
WEIGHT: 233.69 LBS | SYSTOLIC BLOOD PRESSURE: 120 MMHG | RESPIRATION RATE: 17 BRPM | OXYGEN SATURATION: 98 % | DIASTOLIC BLOOD PRESSURE: 65 MMHG | TEMPERATURE: 98.6 F | BODY MASS INDEX: 45.88 KG/M2 | HEART RATE: 71 BPM | HEIGHT: 60 IN

## 2024-12-29 DIAGNOSIS — R11.2 NAUSEA AND VOMITING, UNSPECIFIED VOMITING TYPE: Primary | ICD-10-CM

## 2024-12-29 DIAGNOSIS — K59.09 OTHER CONSTIPATION: ICD-10-CM

## 2024-12-29 DIAGNOSIS — R10.31 RIGHT LOWER QUADRANT ABDOMINAL PAIN: ICD-10-CM

## 2024-12-29 DIAGNOSIS — K08.89 PAIN, DENTAL: ICD-10-CM

## 2024-12-29 LAB
ALBUMIN SERPL-MCNC: 3.6 G/DL (ref 3.5–5)
ALBUMIN/GLOB SERPL: 0.8 (ref 1.1–2.2)
ALP SERPL-CCNC: 63 U/L (ref 45–117)
ALT SERPL-CCNC: 19 U/L (ref 12–78)
ANION GAP SERPL CALC-SCNC: 3 MMOL/L (ref 2–12)
APPEARANCE UR: CLEAR
AST SERPL-CCNC: 17 U/L (ref 15–37)
BACTERIA URNS QL MICRO: ABNORMAL /HPF
BASOPHILS # BLD: 0 K/UL (ref 0–0.1)
BASOPHILS NFR BLD: 0 % (ref 0–1)
BILIRUB SERPL-MCNC: 0.3 MG/DL (ref 0.2–1)
BILIRUB UR QL: NEGATIVE
BUN SERPL-MCNC: 11 MG/DL (ref 6–20)
BUN/CREAT SERPL: 14 (ref 12–20)
CALCIUM SERPL-MCNC: 9.1 MG/DL (ref 8.5–10.1)
CHLORIDE SERPL-SCNC: 107 MMOL/L (ref 97–108)
CO2 SERPL-SCNC: 30 MMOL/L (ref 21–32)
COLOR UR: ABNORMAL
CREAT SERPL-MCNC: 0.76 MG/DL (ref 0.55–1.02)
DIFFERENTIAL METHOD BLD: ABNORMAL
EOSINOPHIL # BLD: 0 K/UL (ref 0–0.4)
EOSINOPHIL NFR BLD: 0 % (ref 0–7)
EPITH CASTS URNS QL MICRO: ABNORMAL /LPF
ERYTHROCYTE [DISTWIDTH] IN BLOOD BY AUTOMATED COUNT: 13.6 % (ref 11.5–14.5)
FLUAV RNA SPEC QL NAA+PROBE: NOT DETECTED
FLUBV RNA SPEC QL NAA+PROBE: NOT DETECTED
GLOBULIN SER CALC-MCNC: 4.4 G/DL (ref 2–4)
GLUCOSE SERPL-MCNC: 108 MG/DL (ref 65–100)
GLUCOSE UR STRIP.AUTO-MCNC: NEGATIVE MG/DL
HCT VFR BLD AUTO: 43.4 % (ref 35–47)
HGB BLD-MCNC: 14 G/DL (ref 11.5–16)
HGB UR QL STRIP: NEGATIVE
HYALINE CASTS URNS QL MICRO: ABNORMAL /LPF (ref 0–2)
IMM GRANULOCYTES # BLD AUTO: 0 K/UL (ref 0–0.04)
IMM GRANULOCYTES NFR BLD AUTO: 0 % (ref 0–0.5)
KETONES UR QL STRIP.AUTO: NEGATIVE MG/DL
LEUKOCYTE ESTERASE UR QL STRIP.AUTO: NEGATIVE
LIPASE SERPL-CCNC: 24 U/L (ref 13–75)
LYMPHOCYTES # BLD: 1.8 K/UL (ref 0.8–3.5)
LYMPHOCYTES NFR BLD: 18 % (ref 12–49)
MCH RBC QN AUTO: 28.5 PG (ref 26–34)
MCHC RBC AUTO-ENTMCNC: 32.3 G/DL (ref 30–36.5)
MCV RBC AUTO: 88.4 FL (ref 80–99)
MONOCYTES # BLD: 0.5 K/UL (ref 0–1)
MONOCYTES NFR BLD: 5 % (ref 5–13)
NEUTS SEG # BLD: 7.5 K/UL (ref 1.8–8)
NEUTS SEG NFR BLD: 77 % (ref 32–75)
NITRITE UR QL STRIP.AUTO: NEGATIVE
NRBC # BLD: 0 K/UL (ref 0–0.01)
NRBC BLD-RTO: 0 PER 100 WBC
PH UR STRIP: 7 (ref 5–8)
PLATELET # BLD AUTO: 337 K/UL (ref 150–400)
PMV BLD AUTO: 8.7 FL (ref 8.9–12.9)
POTASSIUM SERPL-SCNC: 4 MMOL/L (ref 3.5–5.1)
PROT SERPL-MCNC: 8 G/DL (ref 6.4–8.2)
PROT UR STRIP-MCNC: ABNORMAL MG/DL
RBC # BLD AUTO: 4.91 M/UL (ref 3.8–5.2)
RBC #/AREA URNS HPF: ABNORMAL /HPF (ref 0–5)
SARS-COV-2 RNA RESP QL NAA+PROBE: NOT DETECTED
SODIUM SERPL-SCNC: 140 MMOL/L (ref 136–145)
SOURCE: NORMAL
SP GR UR REFRACTOMETRY: 1.03
URINE CULTURE IF INDICATED: ABNORMAL
UROBILINOGEN UR QL STRIP.AUTO: 1 EU/DL (ref 0.2–1)
WBC # BLD AUTO: 9.9 K/UL (ref 3.6–11)
WBC URNS QL MICRO: ABNORMAL /HPF (ref 0–4)

## 2024-12-29 PROCEDURE — 87636 SARSCOV2 & INF A&B AMP PRB: CPT

## 2024-12-29 PROCEDURE — 81001 URINALYSIS AUTO W/SCOPE: CPT

## 2024-12-29 PROCEDURE — 96375 TX/PRO/DX INJ NEW DRUG ADDON: CPT

## 2024-12-29 PROCEDURE — 83690 ASSAY OF LIPASE: CPT

## 2024-12-29 PROCEDURE — 6360000004 HC RX CONTRAST MEDICATION: Performed by: PHYSICIAN ASSISTANT

## 2024-12-29 PROCEDURE — 74177 CT ABD & PELVIS W/CONTRAST: CPT

## 2024-12-29 PROCEDURE — 99285 EMERGENCY DEPT VISIT HI MDM: CPT

## 2024-12-29 PROCEDURE — 85025 COMPLETE CBC W/AUTO DIFF WBC: CPT

## 2024-12-29 PROCEDURE — 36415 COLL VENOUS BLD VENIPUNCTURE: CPT

## 2024-12-29 PROCEDURE — 6360000002 HC RX W HCPCS: Performed by: PHYSICIAN ASSISTANT

## 2024-12-29 PROCEDURE — 96374 THER/PROPH/DIAG INJ IV PUSH: CPT

## 2024-12-29 PROCEDURE — 93005 ELECTROCARDIOGRAM TRACING: CPT | Performed by: STUDENT IN AN ORGANIZED HEALTH CARE EDUCATION/TRAINING PROGRAM

## 2024-12-29 PROCEDURE — 80053 COMPREHEN METABOLIC PANEL: CPT

## 2024-12-29 RX ORDER — POLYETHYLENE GLYCOL 3350 17 G/17G
17 POWDER, FOR SOLUTION ORAL DAILY
Qty: 510 G | Refills: 0 | Status: SHIPPED | OUTPATIENT
Start: 2024-12-29 | End: 2025-01-28

## 2024-12-29 RX ORDER — KETOROLAC TROMETHAMINE 30 MG/ML
15 INJECTION, SOLUTION INTRAMUSCULAR; INTRAVENOUS
Status: COMPLETED | OUTPATIENT
Start: 2024-12-29 | End: 2024-12-29

## 2024-12-29 RX ORDER — IOPAMIDOL 755 MG/ML
100 INJECTION, SOLUTION INTRAVASCULAR
Status: COMPLETED | OUTPATIENT
Start: 2024-12-29 | End: 2024-12-29

## 2024-12-29 RX ORDER — ONDANSETRON 4 MG/1
4 TABLET, FILM COATED ORAL EVERY 8 HOURS PRN
Qty: 15 TABLET | Refills: 0 | Status: SHIPPED | OUTPATIENT
Start: 2024-12-29

## 2024-12-29 RX ORDER — ONDANSETRON 2 MG/ML
4 INJECTION INTRAMUSCULAR; INTRAVENOUS ONCE
Status: COMPLETED | OUTPATIENT
Start: 2024-12-29 | End: 2024-12-29

## 2024-12-29 RX ORDER — CEFDINIR 300 MG/1
300 CAPSULE ORAL 2 TIMES DAILY
Qty: 20 CAPSULE | Refills: 0 | Status: SHIPPED | OUTPATIENT
Start: 2024-12-29 | End: 2025-01-08

## 2024-12-29 RX ADMIN — ONDANSETRON HYDROCHLORIDE 4 MG: 2 INJECTION, SOLUTION INTRAMUSCULAR; INTRAVENOUS at 15:20

## 2024-12-29 RX ADMIN — KETOROLAC TROMETHAMINE 15 MG: 30 INJECTION, SOLUTION INTRAMUSCULAR; INTRAVENOUS at 15:16

## 2024-12-29 RX ADMIN — IOPAMIDOL 100 ML: 755 INJECTION, SOLUTION INTRAVENOUS at 15:32

## 2024-12-29 ASSESSMENT — PAIN - FUNCTIONAL ASSESSMENT: PAIN_FUNCTIONAL_ASSESSMENT: 0-10

## 2024-12-29 ASSESSMENT — PAIN DESCRIPTION - ORIENTATION: ORIENTATION: LEFT

## 2024-12-29 ASSESSMENT — PAIN SCALES - GENERAL
PAINLEVEL_OUTOF10: 2
PAINLEVEL_OUTOF10: 8

## 2024-12-29 ASSESSMENT — PAIN DESCRIPTION - LOCATION: LOCATION: MOUTH

## 2024-12-29 ASSESSMENT — PAIN DESCRIPTION - PAIN TYPE: TYPE: ACUTE PAIN

## 2024-12-29 ASSESSMENT — LIFESTYLE VARIABLES
HOW MANY STANDARD DRINKS CONTAINING ALCOHOL DO YOU HAVE ON A TYPICAL DAY: 1 OR 2
HOW OFTEN DO YOU HAVE A DRINK CONTAINING ALCOHOL: 2-3 TIMES A WEEK

## 2024-12-29 ASSESSMENT — PAIN DESCRIPTION - DESCRIPTORS: DESCRIPTORS: ACHING

## 2024-12-29 NOTE — ED PROVIDER NOTES
Right lower quadrant abdominal pain    3. Pain, dental          DISPOSITION/PLAN   DISPOSITION                 Transition care to oncoming CONSU[facility]O     PATIENT REFERRED TO:  No follow-up provider specified.     DISCHARGE MEDICATIONS:     Medication List        START taking these medications      cefdinir 300 MG capsule  Commonly known as: OMNICEF  Take 1 capsule by mouth 2 times daily for 10 days     ondansetron 4 MG tablet  Commonly known as: Zofran  Take 1 tablet by mouth every 8 hours as needed for Nausea or Vomiting            ASK your doctor about these medications      desvenlafaxine succinate 25 MG Tb24 extended release tablet  Commonly known as: PRISTIQ               Where to Get Your Medications        Information about where to get these medications is not yet available    Ask your nurse or doctor about these medications  cefdinir 300 MG capsule  ondansetron 4 MG tablet           DISCONTINUED MEDICATIONS:  Current Discharge Medication List          I have seen and evaluated the patient autonomously. My supervision physician was on site and available for consultation if needed.     I am the Primary Clinician of Record.   Debra Summers PA-C (electronically signed)    (Please note that parts of this dictation were completed with voice recognition software. Quite often unanticipated grammatical, syntax, homophones, and other interpretive errors are inadvertently transcribed by the computer software. Please disregards these errors. Please excuse any errors that have escaped final proofreading.)         Debra Summers PA-C  12/30/24 [id number]

## 2024-12-31 LAB
EKG ATRIAL RATE: 65 BPM
EKG DIAGNOSIS: NORMAL
EKG P AXIS: 29 DEGREES
EKG P-R INTERVAL: 122 MS
EKG Q-T INTERVAL: 400 MS
EKG QRS DURATION: 82 MS
EKG QTC CALCULATION (BAZETT): 416 MS
EKG R AXIS: 31 DEGREES
EKG T AXIS: 46 DEGREES
EKG VENTRICULAR RATE: 65 BPM

## 2025-01-21 ENCOUNTER — TELEPHONE (OUTPATIENT)
Age: 40
End: 2025-01-21

## 2025-01-29 ENCOUNTER — TELEPHONE (OUTPATIENT)
Age: 40
End: 2025-01-29

## 2025-01-29 NOTE — TELEPHONE ENCOUNTER
Received GARY request from [employer] for [employer] on 1/23/25. Faxed request to [facility] on 1/29/25.

## 2025-02-18 ASSESSMENT — ENCOUNTER SYMPTOMS: SHORTNESS OF BREATH: 0

## 2025-02-19 ENCOUNTER — TELEMEDICINE (OUTPATIENT)
Age: 40
End: 2025-02-19
Payer: MEDICAID

## 2025-02-19 ENCOUNTER — TELEPHONE (OUTPATIENT)
Age: 40
End: 2025-02-19

## 2025-02-19 DIAGNOSIS — F90.8 OTHER SPECIFIED ATTENTION DEFICIT HYPERACTIVITY DISORDER (ADHD): ICD-10-CM

## 2025-02-19 DIAGNOSIS — G62.9 NEUROPATHY: ICD-10-CM

## 2025-02-19 DIAGNOSIS — F32.A ANXIETY AND DEPRESSION: ICD-10-CM

## 2025-02-19 DIAGNOSIS — R73.01 IFG (IMPAIRED FASTING GLUCOSE): ICD-10-CM

## 2025-02-19 DIAGNOSIS — M79.7 FIBROMYALGIA: ICD-10-CM

## 2025-02-19 DIAGNOSIS — M79.89 SOFT TISSUE MASS: ICD-10-CM

## 2025-02-19 DIAGNOSIS — K59.00 CONSTIPATION, UNSPECIFIED CONSTIPATION TYPE: ICD-10-CM

## 2025-02-19 DIAGNOSIS — F41.9 ANXIETY AND DEPRESSION: ICD-10-CM

## 2025-02-19 DIAGNOSIS — E66.01 MORBID OBESITY WITH BODY MASS INDEX (BMI) OF 40.0 TO 49.9: Primary | ICD-10-CM

## 2025-02-19 DIAGNOSIS — E55.9 VITAMIN D DEFICIENCY: ICD-10-CM

## 2025-02-19 DIAGNOSIS — E78.5 DYSLIPIDEMIA: ICD-10-CM

## 2025-02-19 DIAGNOSIS — F51.01 PRIMARY INSOMNIA: ICD-10-CM

## 2025-02-19 PROCEDURE — 99214 OFFICE O/P EST MOD 30 MIN: CPT | Performed by: INTERNAL MEDICINE

## 2025-02-19 RX ORDER — TRAZODONE HYDROCHLORIDE 100 MG/1
TABLET ORAL
COMMUNITY
Start: 2025-01-24

## 2025-02-19 RX ORDER — HYDROXYZINE HYDROCHLORIDE 50 MG/1
TABLET, FILM COATED ORAL
COMMUNITY

## 2025-02-19 RX ORDER — LISDEXAMFETAMINE DIMESYLATE 30 MG/1
CAPSULE ORAL DAILY
COMMUNITY
Start: 2025-01-24

## 2025-02-19 SDOH — ECONOMIC STABILITY: FOOD INSECURITY: WITHIN THE PAST 12 MONTHS, THE FOOD YOU BOUGHT JUST DIDN'T LAST AND YOU DIDN'T HAVE MONEY TO GET MORE.: OFTEN TRUE

## 2025-02-19 SDOH — ECONOMIC STABILITY: FOOD INSECURITY: WITHIN THE PAST 12 MONTHS, YOU WORRIED THAT YOUR FOOD WOULD RUN OUT BEFORE YOU GOT MONEY TO BUY MORE.: OFTEN TRUE

## 2025-02-19 ASSESSMENT — PATIENT HEALTH QUESTIONNAIRE - PHQ9
SUM OF ALL RESPONSES TO PHQ QUESTIONS 1-9: 0
2. FEELING DOWN, DEPRESSED OR HOPELESS: NOT AT ALL
SUM OF ALL RESPONSES TO PHQ QUESTIONS 1-9: 0
1. LITTLE INTEREST OR PLEASURE IN DOING THINGS: NOT AT ALL
SUM OF ALL RESPONSES TO PHQ QUESTIONS 1-9: 0
SUM OF ALL RESPONSES TO PHQ QUESTIONS 1-9: 0
SUM OF ALL RESPONSES TO PHQ9 QUESTIONS 1 & 2: 0

## 2025-02-19 NOTE — TELEPHONE ENCOUNTER
Lm to schedule consult from wq    NP soft tissue mass on back, luc spencer, [facility], notes in cc

## 2025-02-21 NOTE — PROGRESS NOTES
Adding resources requested during appointment to AVS.     Updated AVS sent to patient.   
HISTORY OF PRESENT ILLNESS  Eb Pinto is a 39 y.o. female.  HPI    This is an established visit completed with telemedicine was completed with video assist. The patient acknowledges and agrees to this method of visitation.    Pt was last here 2/13/24. Here for routine care.      She states she has a growth on her back x 6 months, she states it was painful at first, is around dime sized. She will send pictures on [place], referred to gen surg        She has numbness in her R great toe mainly at this point, discussed well fitting shoes, she has seen podiatry in the past who gave her inserts       BP at home not checked       Wt lov 237 lbs, was 233 lbs in the ED 12/24  Discussed diet and wt/l  She previously was taking adipex and naltrexone through nutritionist at [facility], no longer going      Ordered labs  Reminded pt to complete labs    She was in the ED 12/29/24 for nausea and vomiting, dental pain  Sent home with zofran and omnicef     Reviewed CT a/p 12/29/24  Impression:    Constipation.        She saw Dr. Castle (neuro) 11/29/21 for neuropathy symptoms  She is no longer taking gabapentin not interested makes her feel foggy          She saw LINDA Matias (neuro) for adhd  Lov 12/24   Reviewed procedure note 11/19/24  IMPRESSIONS/RECOMMENDATIONS:  Test performance revealed difficulties in the following areas: visual attention, auditory attention, and memory. With regard to memory specifically, Ms. Pinto demonstrated a moderately impoverished learning curve, severe sensitivity to proactive interference, and low average short- and long-delayed recall on a test of list learning and recall. On that same test, forced-choice recognition was tested in the mildly impoverished range. There were no significant perseveration or intrusion errors. This pattern is less amnestic in nature and more indicative of difficulties with learning and sensitivity to interference. Visual memory was tested in the broadly average range. 
\"Have you been to the ER, urgent care clinic since your last visit?  Hospitalized since your last visit?\"    ER 12/2024    “Have you seen or consulted any other health care providers outside our system since your last visit?”    Neuropsych            
Cervical: No cervical adenopathy.   Skin:     General: Skin is warm and dry.      Findings: No erythema.   Neurological:      General: No focal deficit present.      Mental Status: She is oriented to person, place, and time. Mental status is at baseline.      Gait: Gait normal.   Psychiatric:         Mood and Affect: Mood normal.           ASSESSMENT and PLAN  {No diagnosis found. (Refresh or delete this SmartLink)}     Depression screen reviewed and negative.  Scribed by Mimi Cedillo, as dictated by Dr. Kathy Zurita.    Current diagnosis and concerns discussed with pt at length. Pt understands risks and benefits or current treatment plan and medications, and accepts the treatment and medication with any possible risks. Pt asks appropriate questions, which were answered. Pt was instructed to call with any concerns or problems.    I have reviewed the note documented by the scribe. The services provided are my own. The documentation is accurate.

## 2025-02-21 NOTE — PATIENT INSTRUCTIONS
[place], [place], [place], [place], [place] and [place].  Website: [email or web address]  To Apply by phone: [phone]    [place] Senior Resources (PSR) area:   [facility] [place], [place], [place], [place], [facility], and [facility].   Website: [email or web address]  For More Information: Call [phone] or email [email or web address]    Meals on [facility]:   Website: [email or web address]  To Apply Online: [email or web address]  To Apply by phone: [phone]    Meals on [facility]:    To Apply by Phone: [phone]    Other Resources:     [employer]   What they offer: “[employer] is a nonprofit working together to build healthy communities by growing and sharing healthy food.  The food we grow is distributed through our network of programs and partnerships in communities where access to healthy food is limited.”   Website: https://[employer]/find-fresh-food/  Phone: 116.819.1457   Cash, cards, and SNAP/EBT accepted        [employer] [place] [employer]  What they offer: A nutrition midday meal and interaction with fellow community members.  There are 20 [place] [employer] through the region including [facility], and the [facility] of [place], [place], [place], [facility], and [employer]  Website: [email or web address]://[email or web address]-[email or web address]/services/support-to-stay-home/friendship-cafes/  Phone: [phone]  Visit the website above to apply or call [employer] directly to have an application mailed to you.    Donation based fee system for meals   Days and times vary depending on location, but most are open from 9:30AM-1:00PM, 4 days per week, and are closed on Saturday, Sunday, and major holidays

## (undated) DEVICE — STERILE POLYISOPRENE POWDER-FREE SURGICAL GLOVES: Brand: PROTEXIS

## (undated) DEVICE — NEEDLE HYPO 25GA L1.5IN BVL ORIENTED ECLIPSE

## (undated) DEVICE — REM POLYHESIVE ADULT PATIENT RETURN ELECTRODE: Brand: VALLEYLAB

## (undated) DEVICE — 1200 GUARD II KIT W/5MM TUBE W/O VAC TUBE: Brand: GUARDIAN

## (undated) DEVICE — SYR 10ML LUER LOK 1/5ML GRAD --

## (undated) DEVICE — STERILE POLYISOPRENE POWDER-FREE SURGICAL GLOVES WITH EMOLLIENT COATING: Brand: PROTEXIS

## (undated) DEVICE — SURGICAL PROCEDURE PACK BASIN MAJ SET CUST NO CAUT

## (undated) DEVICE — INFECTION CONTROL KIT SYS

## (undated) DEVICE — GAUZE SPONGES,8 PLY: Brand: CURITY

## (undated) DEVICE — DBD-PACK,LAPAROTOMY,2 REINFORCED GOWNS: Brand: MEDLINE

## (undated) DEVICE — SUT ETHBND 0 18IN MO6 MP GRN --

## (undated) DEVICE — DERMABOND SKIN ADH 0.7ML -- DERMABOND ADVANCED 12/BX

## (undated) DEVICE — SOLUTION IV 1000ML 0.9% SOD CHL

## (undated) DEVICE — ROCKER SWITCH PENCIL BLADE ELECTRODE, HOLSTER: Brand: EDGE

## (undated) DEVICE — SUTURE VCRL SZ 4-0 L27IN ABSRB UD L19MM PS-2 3/8 CIR PRIM J426H

## (undated) DEVICE — GOWN,PREVENTION PLUS,XL,ST,24/CS: Brand: MEDLINE

## (undated) DEVICE — SUTURE PROL SZ 2-0 L36IN NONABSORBABLE BLU SH L26MM 1/2 CIR 8523H

## (undated) DEVICE — BINDER ABD M/L H12IN FOR 46-62IN WHT 4 SLD PNL DSGN HOOP

## (undated) DEVICE — TOWEL SURG W17XL27IN STD BLU COT NONFENESTRATED PREWASHED

## (undated) DEVICE — (D)PREP SKN CHLRAPRP APPL 26ML -- CONVERT TO ITEM 371833

## (undated) DEVICE — HANDLE LT SNAP ON ULT DURABLE LENS FOR TRUMPF ALC DISPOSABLE

## (undated) DEVICE — INTENDED FOR TISSUE SEPARATION, AND OTHER PROCEDURES THAT REQUIRE A SHARP SURGICAL BLADE TO PUNCTURE OR CUT.: Brand: BARD-PARKER ® CARBON RIB-BACK BLADES

## (undated) DEVICE — DEVON™ KNEE AND BODY STRAP 60" X 3" (1.5 M X 7.6 CM): Brand: DEVON

## (undated) DEVICE — Device

## (undated) DEVICE — SUTURE VCRL SZ 3-0 L54IN ABSRB VLT LIGAPAK REEL NDL J205G

## (undated) DEVICE — KENDALL SCD EXPRESS SLEEVES, KNEE LENGTH, MEDIUM: Brand: KENDALL SCD

## (undated) DEVICE — SUTURE VCRL SZ 3-0 L27IN ABSRB UD L26MM SH 1/2 CIR J416H